# Patient Record
Sex: FEMALE | Race: WHITE | NOT HISPANIC OR LATINO | Employment: OTHER | URBAN - METROPOLITAN AREA
[De-identification: names, ages, dates, MRNs, and addresses within clinical notes are randomized per-mention and may not be internally consistent; named-entity substitution may affect disease eponyms.]

---

## 2017-01-05 ENCOUNTER — RADIANT APPOINTMENT (OUTPATIENT)
Dept: MRI IMAGING | Facility: CLINIC | Age: 72
End: 2017-01-05
Attending: FAMILY MEDICINE
Payer: COMMERCIAL

## 2017-01-05 DIAGNOSIS — M47.12 CERVICAL SPONDYLOSIS WITH MYELOPATHY: ICD-10-CM

## 2017-01-05 PROCEDURE — 72141 MRI NECK SPINE W/O DYE: CPT | Performed by: RADIOLOGY

## 2017-01-05 NOTE — Clinical Note
January 9, 2017      Whitley Wesley  [address]   [place] [address]          Dear Whitley,    Your test results are attached.     The MRI shows narrowing in the neck part of the spine and this may be pinching on the nerves. Please follow up with the specialist as scheduled for further evaluation and treatment.     Please call me if you have any questions about these test results or about your care.    Sincerely,      Mary Rolle MD/deny    Results for orders placed or performed in visit on 01/05/17   MR Cervical Spine w/o Contrast    Narrative    MRI of the Cervical Spine without contrast    History: evaluate for spinal stenosis and spondylolithesis, Other  spondylosis with myelopathy, cervical region.  Comparison: Plain films 12/27/2016     Technique: Sagittal T1-weighted, T2-weighted, sagittal STIR, axial  T2-weighted spin echo and gradient echo images, and sagittal  diffusion-weighted images were obtained of the cervical spine without  intravenous contrast.    Findings:  The cervical vertebrae appear overall well aligned with the exception  of slight retrolisthesis of about 2 mm of C4 on C5 and C3 minimally  anterior to C4.  There is moderate to severe disc space narrowing at  C4-5, and a lesser degree at C5-6, with disc desiccation at each level  in the cervical region.  There is no definite abnormal signal within  the cervical spinal cord at any level.  Regarding bone marrow signal  intensity, no abnormality is visualized on STIR images.    The findings on a level by level basis are as follows:    C2-3:  There is no focal abnormality.    C3-4:  Small central herniation without spinal canal or foraminal  stenosis.    C4-5:  There is moderate spinal canal stenosis with cord compression  due to broad posterior disc bulge superimposed on ligamentum flavum  thickening as well posteriorly. Bilateral moderate to severe neural  foraminal stenoses due to uncinate and facet hypertrophy,  best  demonstrated on oblique sagittal images.    C5-6:  There is no focal abnormality.    C6-7:  Moderate left foraminal stenosis due to uncinate hypertrophy  predominantly with superimposed broad bulge extending into the neural  foramina.    C7-T1: Bilateral uncinate and facet hypertrophy without foraminal or  spinal canal stenosis.    No definite abnormality is noted of the visualized paraspinous  tissues.      Impression    Impression:    1. Multilevel moderate degree of cervical vertebral degenerative  disease, most prominent being the C4-5 spinal canal stenosis. There is  also bilateral neural foraminal stenoses at that level.  2. No abnormal signal within the cervical spinal cord.    LONNIE GILLIS MD

## 2017-01-08 NOTE — PROGRESS NOTES
Quick Note:    Dear Whitley Wesley,    Your test results are attached.     The MRI shows narrowing in the neck part of the spine and this may be pinching on the nerves. Please follow up with the specialist as scheduled for further evaluation and treatment.     Please call me if you have any questions about these test results or about your care.    Sincerely,    Mary Rolle MD  ______

## 2017-01-11 ENCOUNTER — OFFICE VISIT (OUTPATIENT)
Dept: ORTHOPEDICS | Facility: CLINIC | Age: 72
End: 2017-01-11
Payer: COMMERCIAL

## 2017-01-11 VITALS
BODY MASS INDEX: 24.92 KG/M2 | HEIGHT: 61 IN | SYSTOLIC BLOOD PRESSURE: 110 MMHG | HEART RATE: 75 BPM | OXYGEN SATURATION: 98 % | WEIGHT: 132 LBS | DIASTOLIC BLOOD PRESSURE: 62 MMHG

## 2017-01-11 DIAGNOSIS — M54.12 CERVICAL RADICULOPATHY: Primary | ICD-10-CM

## 2017-01-11 PROCEDURE — 99213 OFFICE O/P EST LOW 20 MIN: CPT | Performed by: FAMILY MEDICINE

## 2017-01-11 RX ORDER — PREDNISONE 20 MG/1
40 TABLET ORAL DAILY
Qty: 10 TABLET | Refills: 0 | Status: SHIPPED | OUTPATIENT
Start: 2017-01-11 | End: 2017-01-16

## 2017-01-11 ASSESSMENT — PAIN SCALES - GENERAL: PAINLEVEL: WORST PAIN (10)

## 2017-01-11 NOTE — NURSING NOTE
"Whitley Wesley's goals for this visit include: Find a solution for neck pain.   She requests these members of her care team be copied on today's visit information: yes    PCP: Mary Rolle    Referring Provider:  No referring provider defined for this encounter.    Chief Complaint   Patient presents with     Consult     Neck Pain     Left side of the neck. Pain for 4 months. No known injury.        Initial /62 mmHg  Pulse 75  Ht 1.544 m (5' 0.8\")  Wt 59.875 kg (132 lb)  BMI 25.12 kg/m2  SpO2 98% Estimated body mass index is 25.12 kg/(m^2) as calculated from the following:    Height as of this encounter: 1.544 m (5' 0.8\").    Weight as of this encounter: 59.875 kg (132 lb).  BP completed using cuff size: regular    "

## 2017-01-11 NOTE — MR AVS SNAPSHOT
After Visit Summary   2017    Whitley Wesley    MRN: [id number]           Patient Information     Date Of Birth          1945        Visit Information        Provider Department      2017 9:00 AM Deep Bower,  [facility]        Today's Diagnoses     Cervical radiculopathy    -  1       Care Instructions    Thanks for coming today.  [facility]  [address] [place] [address]    To schedule future appointments in Ortho Clinic, you may call [phone].    To schedule ordered imaging by your provider:   Call Central Imaging [phone]    To schedule an injection ordered by your provider:  Call Central Imaging Injection scheduling line: [phone]  [employer] available online at:  [email or web address]    Please call if any further questions or concerns ([phone]).  Clinic hours 8 am to 5 pm.    Return to clinic (call) if symptoms worsen or fail to improve.          Follow-ups after your visit        Additional Services     PHYSICAL THERAPY REFERRAL       Please eval and treat for cervical radiculopathy                  Who to contact     If you have questions or need follow up information about today's clinic visit or your schedule please contact [facility] directly at [phone].  Normal or non-critical lab and imaging results will be communicated to you by MyChart, letter or phone within 4 business days after the clinic has received the results. If you do not hear from us within 7 days, please contact the clinic through [employer]Class[employer] or phone. If you have a critical or abnormal lab result, we will notify you by phone as soon as possible.  Submit refill requests through [employer] or call your pharmacy and they will forward the refill request to us. Please allow 3 business days for your refill to be completed.          Additional Information About Your Visit        MyChart Information     [employer]Class[employer]alt is an  "electronic gateway that provides easy, online access to your medical records. With "[employer] you can request a clinic appointment, read your test results, renew a prescription or communicate with your care team.     To sign up for "[employer] visit the website at [email or web address]   You will be asked to enter the access code listed below, as well as some personal information. Please follow the directions to create your username and password.     Your access code is: [email or web address]  Expires: 2017 12:59 PM     Your access code will  in 90 days. If you need help or a new code, please contact your [facility] or call [phone] for assistance.        Care EveryWhere ID     This is your Care EveryWhere ID. This could be used by other organizations to access your [facility] medical records  [id number]        Your Vitals Were     Pulse Height BMI (Body Mass Index) Pulse Oximetry          75 1.544 m (5' 0.8\") 25.12 kg/m2 98%         Blood Pressure from Last 3 Encounters:   17 110/62   16 132/80   16 138/81    Weight from Last 3 Encounters:   17 59.875 kg (132 lb)   16 60.238 kg (132 lb 12.8 oz)   16 57.607 kg (127 lb)              We Performed the Following     PHYSICAL THERAPY REFERRAL          Today's Medication Changes          These changes are accurate as of: 17  9:40 AM.  If you have any questions, ask your nurse or doctor.               Start taking these medicines.        Dose/Directions    predniSONE 20 MG tablet   Commonly known as:  DELTASONE   Used for:  Cervical radiculopathy        Dose:  40 mg   Take 2 tablets (40 mg) by mouth daily for 5 days   Quantity:  10 tablet   Refills:  0            Where to get your medicines      These medications were sent to [facility]/pharmacy #[id number] - [place], [place] - [address]  [address] [facility] [address]     Phone:  [phone]    - predniSONE 20 MG tablet             " Primary Care Provider Office Phone # Fax #    Mary Rolle [phone]       [facility] [address] [place]  [place] [address]        Thank you!     Thank you for choosing [facility]  for your care. Our goal is always to provide you with excellent care. Hearing back from our patients is one way we can continue to improve our services. Please take a few minutes to complete the written survey that you may receive in the mail after your visit with us. Thank you!             Your Updated Medication List - Protect others around you: Learn how to safely use, store and throw away your medicines at www.disposemymeds.org.          This list is accurate as of: 1/11/17  9:40 AM.  Always use your most recent med list.                   Brand Name Dispense Instructions for use    acetaminophen 500 MG tablet    TYLENOL     Take 500-1,000 mg by mouth as needed for mild pain       ALPRAZolam 0.25 MG tablet    XANAX    90 tablet    Take 1 tablet (0.25 mg) by mouth 3 times daily AS NEEDED FOR ANXIETY       aspirin 81 MG tablet      Take  by mouth daily. *       CALCIUM 1200 PO      Take  by mouth.       cyanocobalamin 1000 MCG tablet    vitamin  B-12     Take 1,000 mcg by mouth daily       Magnesium Oxide -Mg Supplement 250 MG Tabs      Take by mouth daily.       MULTI-VITAMIN DAILY PO      Take 1 tablet by mouth.       OMEGA-3 FISH OIL PO      Take 1,200 mg by mouth daily       predniSONE 20 MG tablet    DELTASONE    10 tablet    Take 2 tablets (40 mg) by mouth daily for 5 days       Vitamin D3 3000 UNITS Tabs      Take 5,000 Units by mouth

## 2017-01-11 NOTE — PATIENT INSTRUCTIONS
Thanks for coming today.  [facility]  [address] [place] [address]    To schedule future appointments in Ortho Clinic, you may call [phone].    To schedule ordered imaging by your provider:   Call Central Imaging [phone]    To schedule an injection ordered by your provider:  Call Central Imaging Injection scheduling line: [phone]  [employer] available online at:  [email or web address]    Please call if any further questions or concerns ([phone]).  Clinic hours 8 am to 5 pm.    Return to clinic (call) if symptoms worsen or fail to improve.

## 2017-01-11 NOTE — PROGRESS NOTES
HISTORY OF PRESENT ILLNESS  Ms. Wesley is a pleasant 71 year old year old female who presents to clinic today with cervical radiculpathy.  Whitley explains that her neck started to bother her about 4 months ago insidiously.  She was seen by Dr. Rolle and had an MR done a few weeks ago after an episode of intense headache. Really bothers her with movement, can't sleep on her left side.  Has tried hot packs, analgesics, some relief.  Quality:  achy pain, throbs    Severity: severe    Timing: occurs intermittently  Context: occurs while moving and rotating the neck  Modifying factors: resting and non-use makes it better, movement and use makes it worse  Associated signs & symptoms: none  Previous similar pain: yes  Additional history: as documented    MEDICAL HISTORY  Patient Active Problem List   Diagnosis     CARDIOVASCULAR SCREENING; LDL GOAL LESS THAN 130     Mild major depression (H)     Advanced directives, counseling/discussion     Colon polyps     Cataracts, both eyes     Generalized anxiety disorder     Caregiver stress     Cervicalgia     Cervical spondylosis with myelopathy     DDD (degenerative disc disease), cervical       Current Outpatient Prescriptions   Medication Sig Dispense Refill     cyanocobalamin (VITAMIN  B-12) 1000 MCG tablet Take 1,000 mcg by mouth daily       acetaminophen (TYLENOL) 500 MG tablet Take 500-1,000 mg by mouth as needed for mild pain       ALPRAZolam (XANAX) 0.25 MG tablet Take 1 tablet (0.25 mg) by mouth 3 times daily AS NEEDED FOR ANXIETY 90 tablet 0     Omega-3 Fatty Acids (OMEGA-3 FISH OIL PO) Take 1,200 mg by mouth daily       Cholecalciferol (VITAMIN D3) 3000 UNITS TABS Take 5,000 Units by mouth       aspirin 81 MG tablet Take  by mouth daily. *       Multiple Vitamin (MULTI-VITAMIN DAILY PO) Take 1 tablet by mouth.       Calcium Carbonate-Vit D-Min (CALCIUM 1200 PO) Take  by mouth.       Magnesium Oxide -Mg Supplement 250 MG TABS Take by mouth daily.          Allergies  "  Allergen Reactions     No Known Drug Allergies        Family History   Problem Relation Age of Onset     CANCER Mother      \"urinary\"     CANCER Brother      lung     DIABETES No family hx of      Hypertension No family hx of      CEREBROVASCULAR DISEASE No family hx of      Thyroid Disease No family hx of      Glaucoma No family hx of      Macular Degeneration No family hx of        Additional medical/Social/Surgical histories reviewed in [facility] and updated as appropriate.     REVIEW OF SYSTEMS (1/11/2017)  10 point ROS of systems including Constitutional, Eyes, Respiratory, Cardiovascular, Gastroenterology, Genitourinary, Integumentary, Musculoskeletal, Psychiatric were all negative except for pertinent positives noted in my HPI.     PHYSICAL EXAM  Filed Vitals:    01/11/17 0922   BP: 110/62   Pulse: 75   Height: 1.544 m (5' 0.8\")   Weight: 59.875 kg (132 lb)   SpO2: 98%     General  - normal appearance, in no obvious distress  CV  - normal peripheral perfusion  Pulm  - normal respiratory pattern, non-labored  Musculoskeletal - cervical spine  - inspection: normal bone and joint alignment, no obvious kyphosis  - palpation: no paravertebral or bony tenderness  - ROM: pain with rotation and sidebending  - strength: upper extremities 5/5 in all planes  - special tests:  (-) Spurling bilaterally  (-) Dominique's reflex  Neuro  - C5-7 DTRs 2+ bilaterally, no sensory or motor deficit, grossly normal coordination, normal muscle tone  Skin  - no ecchymosis, erythema, warmth, or induration, no obvious rash  Psych  - interactive, appropriate, normal mood and affect          ASSESSMENT & PLAN  Ms. Wesley is a 71 year old year old female who is in the office today with neck pain.    I reviewed her MR in the room with her which reads:    Impression:     1. Multilevel moderate degree of cervical vertebral degenerative  disease, most prominent being the C4-5 spinal canal stenosis. There is  also bilateral neural foraminal stenoses " at that level.  2. No abnormal signal within the cervical spinal cord.    Whitley and I had a good discussion regarding non-operative treatment for degenerative disc disease.  This included strengthening of the paraspinal and core muscles, weight control, and oral analgesics when needed.  We also discussed the role of epidural corticosteroid injections.    I prescribed her a burst of prednisone and referred her to physical therapy.    Whitley and I will follow up in 4-6 weeks.    It was a pleasure taking care of Whitley.        Deep Bower DO, CAM

## 2017-01-12 ENCOUNTER — THERAPY VISIT (OUTPATIENT)
Dept: PHYSICAL THERAPY | Facility: CLINIC | Age: 72
End: 2017-01-12
Payer: COMMERCIAL

## 2017-01-12 DIAGNOSIS — M54.12 CERVICAL RADICULOPATHY: Primary | ICD-10-CM

## 2017-01-12 PROCEDURE — 97161 PT EVAL LOW COMPLEX 20 MIN: CPT | Mod: GP | Performed by: PHYSICAL THERAPIST

## 2017-01-12 PROCEDURE — 97112 NEUROMUSCULAR REEDUCATION: CPT | Mod: GP | Performed by: PHYSICAL THERAPIST

## 2017-01-12 PROCEDURE — G8982 BODY POS GOAL STATUS: HCPCS | Mod: GP | Performed by: PHYSICAL THERAPIST

## 2017-01-12 PROCEDURE — G8981 BODY POS CURRENT STATUS: HCPCS | Mod: GP | Performed by: PHYSICAL THERAPIST

## 2017-01-12 PROCEDURE — 97530 THERAPEUTIC ACTIVITIES: CPT | Mod: GP | Performed by: PHYSICAL THERAPIST

## 2017-01-12 NOTE — PROGRESS NOTES
[facility] Initial Evaluation      Subjective:    Whitley Wesley is a 71 year old female with a cervical spine condition.    Condition occurred: for unknown reasons.  This is a chronic condition  4-5 months.   Md: 1/11/2017. .    Patient reports pain:  Cervical left side.  Radiates to:  Head.  Pain is described as sharp and aching and is constant and reported as 8/10.  Associated symptoms:  Headache, loss of motion/stiffness and tingling. Pain is the same all the time.  Symptoms are exacerbated by rotating head, looking up or down and sitting and relieved by heat.  Since onset symptoms are unchanged.  Special tests:  MRI.  Previous treatment: none.    General health as reported by patient is excellent.  Pertinent medical history includes:  None.  Medical allergies: no.  Other surgeries include:  No.  Current medications:  Pain medication (prednisone. ).  Current occupation is retired.        Barriers include:  None as reported by the patient.    Red flags:  Chest pain.                      Objective:    System    Physical Exam    [place] Cervical Evaluation    Posture:  Sitting: poor        Correction of Posture: no effect    Movement Loss:  Protrusion (PRO): nil  Flexion (Flex): nil  Retraction (RET): pain  Extension (EXT): pain  Lateral Flexion Right (LF R): pain      Rotation Left (ROT L): pain  Test Movements:      RET: During: no effect    Repeat RET: During: decreases    RET EXT: During: centralizing    Repeat RET EXT: During: centralizing                          Conclusion: derangement  Principle of Treatment:  Posture Correction: IN SITTING WITH TOWEL ROLL.     Extension: RETRACTION , RETRACTION EXTENSION    Other: NEUTRAL SPINE, THER EX, THER ACT, NMR, MANUAL THERAPY.                                          ROS    Assessment/Plan:      Patient is a 71 year old female with cervical complaints.    Patient has the following significant findings with corresponding treatment plan.                 Diagnosis 1:  CERVICAL RADICULOPATHY  Pain -  hot/cold therapy, US, electric stimulation, mechanical traction, manual therapy, splint/taping/bracing/orthotics, self management, education, directional preference exercise and home program    Therapy Evaluation Codes:   1) History comprised of:   Personal factors that impact the plan of care:      None.    Comorbidity factors that impact the plan of care are:      None.     Medications impacting care: Steroids.  2) Examination of Body Systems comprised of:   Body structures and functions that impact the plan of care:      Cervical spine.   Activity limitations that impact the plan of care are:      Sitting.  3) Clinical presentation characteristics are:   Stable/Uncomplicated.  4) Decision-Making    Low complexity using standardized patient assessment instrument and/or measureable assessment of functional outcome.  Cumulative Therapy Evaluation is: Low complexity.    Previous and current functional limitations:  (See Goal Flow Sheet for this information)    Short term and Long term goals: (See Goal Flow Sheet for this information)     Communication ability:  Patient appears to be able to clearly communicate and understand verbal and written communication and follow directions correctly.  Treatment Explanation - The following has been discussed with the patient:   RX ordered/plan of care  Anticipated outcomes  Possible risks and side effects  This patient would benefit from PT intervention to resume normal activities.   Rehab potential is fair.    Frequency:  1 X week, once daily  Duration:  for 6 weeks  Discharge Plan:  Achieve all LTG.  Independent in home treatment program.  Reach maximal therapeutic benefit.      Please refer to the daily flowsheet for treatment today, total treatment time and time spent performing 1:1 timed codes.

## 2017-01-19 ENCOUNTER — THERAPY VISIT (OUTPATIENT)
Dept: PHYSICAL THERAPY | Facility: CLINIC | Age: 72
End: 2017-01-19
Payer: COMMERCIAL

## 2017-01-19 DIAGNOSIS — M54.12 CERVICAL RADICULOPATHY: Primary | ICD-10-CM

## 2017-01-19 PROCEDURE — 97140 MANUAL THERAPY 1/> REGIONS: CPT | Mod: GP | Performed by: PHYSICAL THERAPIST

## 2017-01-19 PROCEDURE — 97035 APP MDLTY 1+ULTRASOUND EA 15: CPT | Mod: GP | Performed by: PHYSICAL THERAPIST

## 2017-01-19 PROCEDURE — 97110 THERAPEUTIC EXERCISES: CPT | Mod: GP | Performed by: PHYSICAL THERAPIST

## 2017-04-07 DIAGNOSIS — F41.1 GENERALIZED ANXIETY DISORDER: ICD-10-CM

## 2017-04-07 RX ORDER — ALPRAZOLAM 0.25 MG
TABLET ORAL
Qty: 90 TABLET | Refills: 0 | Status: SHIPPED | OUTPATIENT
Start: 2017-04-07 | End: 2017-07-07

## 2017-06-19 ENCOUNTER — OFFICE VISIT (OUTPATIENT)
Dept: FAMILY MEDICINE | Facility: CLINIC | Age: 72
End: 2017-06-19
Payer: COMMERCIAL

## 2017-06-19 VITALS
HEART RATE: 80 BPM | OXYGEN SATURATION: 97 % | SYSTOLIC BLOOD PRESSURE: 117 MMHG | BODY MASS INDEX: 25.3 KG/M2 | WEIGHT: 134 LBS | DIASTOLIC BLOOD PRESSURE: 77 MMHG | TEMPERATURE: 98.6 F | HEIGHT: 61 IN

## 2017-06-19 DIAGNOSIS — R53.82 CHRONIC FATIGUE: ICD-10-CM

## 2017-06-19 DIAGNOSIS — Z00.00 NORMAL PHYSICAL EXAM: Primary | ICD-10-CM

## 2017-06-19 DIAGNOSIS — E78.5 HYPERLIPIDEMIA LDL GOAL <160: ICD-10-CM

## 2017-06-19 DIAGNOSIS — F32.0 MAJOR DEPRESSIVE DISORDER, SINGLE EPISODE, MILD (H): ICD-10-CM

## 2017-06-19 DIAGNOSIS — Z63.6 CAREGIVER STRESS: ICD-10-CM

## 2017-06-19 DIAGNOSIS — F41.1 GENERALIZED ANXIETY DISORDER: ICD-10-CM

## 2017-06-19 DIAGNOSIS — E55.9 VITAMIN D DEFICIENCY: ICD-10-CM

## 2017-06-19 LAB
ALBUMIN SERPL-MCNC: 3.6 G/DL (ref 3.4–5)
ALBUMIN UR-MCNC: NEGATIVE MG/DL
ALP SERPL-CCNC: 70 U/L (ref 40–150)
ALT SERPL W P-5'-P-CCNC: 31 U/L (ref 0–50)
ANION GAP SERPL CALCULATED.3IONS-SCNC: 7 MMOL/L (ref 3–14)
APPEARANCE UR: CLEAR
AST SERPL W P-5'-P-CCNC: 19 U/L (ref 0–45)
BACTERIA #/AREA URNS HPF: ABNORMAL /HPF
BILIRUB SERPL-MCNC: 0.9 MG/DL (ref 0.2–1.3)
BILIRUB UR QL STRIP: NEGATIVE
BUN SERPL-MCNC: 16 MG/DL (ref 7–30)
CALCIUM SERPL-MCNC: 9.2 MG/DL (ref 8.5–10.1)
CHLORIDE SERPL-SCNC: 110 MMOL/L (ref 94–109)
CHOLEST SERPL-MCNC: 220 MG/DL
CO2 SERPL-SCNC: 27 MMOL/L (ref 20–32)
COLOR UR AUTO: YELLOW
CREAT SERPL-MCNC: 0.88 MG/DL (ref 0.52–1.04)
DEPRECATED CALCIDIOL+CALCIFEROL SERPL-MC: 80 UG/L (ref 20–75)
ERYTHROCYTE [DISTWIDTH] IN BLOOD BY AUTOMATED COUNT: 13.1 % (ref 10–15)
GFR SERPL CREATININE-BSD FRML MDRD: 63 ML/MIN/1.7M2
GLUCOSE SERPL-MCNC: 86 MG/DL (ref 70–99)
GLUCOSE UR STRIP-MCNC: NEGATIVE MG/DL
HCT VFR BLD AUTO: 43.6 % (ref 35–47)
HDLC SERPL-MCNC: 92 MG/DL
HGB BLD-MCNC: 14.7 G/DL (ref 11.7–15.7)
HGB UR QL STRIP: NEGATIVE
KETONES UR STRIP-MCNC: NEGATIVE MG/DL
LDLC SERPL CALC-MCNC: 112 MG/DL
LEUKOCYTE ESTERASE UR QL STRIP: ABNORMAL
MCH RBC QN AUTO: 31.5 PG (ref 26.5–33)
MCHC RBC AUTO-ENTMCNC: 33.7 G/DL (ref 31.5–36.5)
MCV RBC AUTO: 94 FL (ref 78–100)
MUCOUS THREADS #/AREA URNS LPF: PRESENT /LPF
NITRATE UR QL: NEGATIVE
NON-SQ EPI CELLS #/AREA URNS LPF: ABNORMAL /LPF
NONHDLC SERPL-MCNC: 128 MG/DL
PH UR STRIP: 5.5 PH (ref 5–7)
PLATELET # BLD AUTO: 239 10E9/L (ref 150–450)
POTASSIUM SERPL-SCNC: 4.3 MMOL/L (ref 3.4–5.3)
PROT SERPL-MCNC: 6.9 G/DL (ref 6.8–8.8)
RBC # BLD AUTO: 4.66 10E12/L (ref 3.8–5.2)
RBC #/AREA URNS AUTO: ABNORMAL /HPF (ref 0–2)
SODIUM SERPL-SCNC: 144 MMOL/L (ref 133–144)
SP GR UR STRIP: >1.03 (ref 1–1.03)
T4 FREE SERPL-MCNC: 1.03 NG/DL (ref 0.76–1.46)
TRIGL SERPL-MCNC: 78 MG/DL
TSH SERPL DL<=0.005 MIU/L-ACNC: 4.11 MU/L (ref 0.4–4)
URN SPEC COLLECT METH UR: ABNORMAL
UROBILINOGEN UR STRIP-ACNC: 0.2 EU/DL (ref 0.2–1)
WBC # BLD AUTO: 7 10E9/L (ref 4–11)
WBC #/AREA URNS AUTO: ABNORMAL /HPF (ref 0–2)

## 2017-06-19 PROCEDURE — 81001 URINALYSIS AUTO W/SCOPE: CPT | Performed by: FAMILY MEDICINE

## 2017-06-19 PROCEDURE — 36415 COLL VENOUS BLD VENIPUNCTURE: CPT | Performed by: FAMILY MEDICINE

## 2017-06-19 PROCEDURE — 85027 COMPLETE CBC AUTOMATED: CPT | Performed by: FAMILY MEDICINE

## 2017-06-19 PROCEDURE — 84439 ASSAY OF FREE THYROXINE: CPT | Performed by: FAMILY MEDICINE

## 2017-06-19 PROCEDURE — 80053 COMPREHEN METABOLIC PANEL: CPT | Performed by: FAMILY MEDICINE

## 2017-06-19 PROCEDURE — 84443 ASSAY THYROID STIM HORMONE: CPT | Performed by: FAMILY MEDICINE

## 2017-06-19 PROCEDURE — 99397 PER PM REEVAL EST PAT 65+ YR: CPT | Performed by: FAMILY MEDICINE

## 2017-06-19 PROCEDURE — 82306 VITAMIN D 25 HYDROXY: CPT | Performed by: FAMILY MEDICINE

## 2017-06-19 PROCEDURE — 80061 LIPID PANEL: CPT | Performed by: FAMILY MEDICINE

## 2017-06-19 SDOH — SOCIAL STABILITY - SOCIAL INSECURITY: DEPENDENT RELATIVE NEEDING CARE AT HOME: Z63.6

## 2017-06-19 ASSESSMENT — ANXIETY QUESTIONNAIRES
3. WORRYING TOO MUCH ABOUT DIFFERENT THINGS: NOT AT ALL
7. FEELING AFRAID AS IF SOMETHING AWFUL MIGHT HAPPEN: NOT AT ALL
GAD7 TOTAL SCORE: 0
1. FEELING NERVOUS, ANXIOUS, OR ON EDGE: NOT AT ALL
2. NOT BEING ABLE TO STOP OR CONTROL WORRYING: NOT AT ALL
6. BECOMING EASILY ANNOYED OR IRRITABLE: NOT AT ALL
5. BEING SO RESTLESS THAT IT IS HARD TO SIT STILL: NOT AT ALL
IF YOU CHECKED OFF ANY PROBLEMS ON THIS QUESTIONNAIRE, HOW DIFFICULT HAVE THESE PROBLEMS MADE IT FOR YOU TO DO YOUR WORK, TAKE CARE OF THINGS AT HOME, OR GET ALONG WITH OTHER PEOPLE: NOT DIFFICULT AT ALL

## 2017-06-19 ASSESSMENT — PATIENT HEALTH QUESTIONNAIRE - PHQ9: 5. POOR APPETITE OR OVEREATING: NOT AT ALL

## 2017-06-19 NOTE — PATIENT INSTRUCTIONS
Preventive Health Recommendations  Female Ages 65 +    Yearly exam:     See your health care provider every year in order to  o Review health changes.   o Discuss preventive care.    o Review your medicines if your doctor has prescribed any.      You no longer need a yearly Pap test unless you've had an abnormal Pap test in the past 10 years. If you have vaginal symptoms, such as bleeding or discharge, be sure to talk with your provider about a Pap test.      Every 1 to 2 years, have a mammogram.  If you are over 69, talk with your health care provider about whether or not you want to continue having screening mammograms.      Every 10 years, have a colonoscopy. Or, have a yearly FIT test (stool test). These exams will check for colon cancer.       Have a cholesterol test every 5 years, or more often if your doctor advises it.       Have a diabetes test (fasting glucose) every three years. If you are at risk for diabetes, you should have this test more often.       At age 65, have a bone density scan (DEXA) to check for osteoporosis (brittle bone disease).    Shots:    Get a flu shot each year.    Get a tetanus shot every 10 years.    Talk to your doctor about your pneumonia vaccines. There are now two you should receive - Pneumovax (PPSV 23) and Prevnar (PCV 13).    Talk to your doctor about the shingles vaccine.    Talk to your doctor about the hepatitis B vaccine.    Nutrition:     Eat at least 5 servings of fruits and vegetables each day.      Eat whole-grain bread, whole-wheat pasta and brown rice instead of white grains and rice.      Talk to your provider about Calcium and Vitamin D.     Lifestyle    Exercise at least 150 minutes a week (30 minutes a day, 5 days a week). This will help you control your weight and prevent disease.      Limit alcohol to one drink per day.      No smoking.       Wear sunscreen to prevent skin cancer.       See your dentist twice a year for an exam and cleaning.      See your  eye doctor every 1 to 2 years to screen for conditions such as glaucoma, macular degeneration, cataracts, etc     This summary includes the important diagnoses, test, medications and other important parts of your medical history.  Below are a few good we sites you can use to learn more about these.     Www.[employer].org : Up to date and easily searchable information on multiple topics.  Www.[employer].org/Pharmacy/c_539084.asp : [facility] $4.99 medications  Www.medlineplus.gov : medication info, interactive tutorials, watch real surgeries online  Www.familydoctor.org : good info from the Academy of Family Physicians  Www.Appticles.BuildingIQ : good info from the [facility]  Www.cdc.gov : public health info, travel advisories, epidemics (H1N1)  Www.aap.org : children's health info, normal development, vaccinations  Www.health.state.[place].us : [place] dept of heat, public health issues in [place], N1N1    Based on your medical history and these are the current health maintenance or preventive care services that you are due for (some may have been done at this visit:)  Health Maintenance Due   Topic Date Due     EYE EXAM Q1 YEAR  01/26/2017     FALL RISK ASSESSMENT  03/03/2017     DEPRESSION ACTION PLAN Q1 YR  03/03/2017     PHQ-9 Q6 MONTHS  06/27/2017     =================================================================================  Normal Values   Blood pressure  <140/90 for most adults    <130/80 for some chronic diseases (ask your care team about yours)    BMI (body mass index)  18.5-25 kg/m2 (based on height and weight)     Thank you for visiting [facility]    Normal or non-critical lab and imaging results will be communicated to you by MyChart, letter or phone within 7 days.  If you do not hear from us within 10 days, please call the clinic. If you have a critical or abnormal lab result, we will notify you by phone as soon as possible.     If you have any questions regarding your visit please  contact:     Team Comfort:   Clinic Hours Telephone Number   Dr. Omar Joseph   7am-5pm  Monday - Friday [phone]  Marcus RN   Pharmacy 8am-8pm Monday-Thursday      8am-6pm Friday  9am-5pm Saturday-Sunday [phone]   Urgent Care 11am-8pm Monday-Friday        9am-5pm Saturday-Sunday [phone]     After hours, weekend or if you need to make an appointment with your primary provider please call [phone].   After Hours nurse advise: call [facility]: [phone]    Medication Refills:  Call your pharmacy and they will forward the refill to us. Please allow 3 business days for your refills to be completed.    Use [email or web address] (secure email communication and access to your chart) to send your primary care provider a message or make an appointment. Ask someone on your Team how to sign up for [email or web address]. To log on to [employer] Alert[employer] or for more information in [place] please visit the website at [email or web address].  As of October 8, 2013, all password changes, disabled accounts, or ID changes in [email or web address] will be done by our Access Services Department.   If you need help with your account or password, call: [phone]. Clinic staff no longer has the ability to change passwords.     Managing Fatigue     Family members can help with meals and chores around the house.   Fatigue is common. It can be caused by worry, lack of sleep, or poor appetite. Fatigue can also be a sign of anemia, a shortage of red blood cells. You might need medical treatment for anemia. The tips below can help you feel better.  Conserving energy    Keep track of the times of day when you are most tired and plan around them. For instance, if you are more tired in the afternoon, try to get tasks done in the morning.    Decide which tasks are most important. Do those first.    Pass tasks along to others when you need to. Ask for help.    Accept help  when it s offered. Tell people what they can do to help. For instance, you may need someone to fix a meal, fold clothes, or put gas in your car.    Plan rest times. You may want to take a nap each day. Just sitting quietly for a few minutes can make you feel more rested.  What you can do to feel better    Relax before you try to sleep. Take a bath or read for a while.    Form a sleep pattern. Go to bed at the same time each night and get up at the same time each morning.    Eat well. Choose foods from all of the food groups each day.    Exercise. Take a brisk walk to help increase your energy.    Avoid caffeine and alcohol. Drink plenty of water or fruit juices instead.  Treating anemia  If you begin to feel more tired than normal, tell your doctor. Fatigue could be a sign of anemia. This problem is fairly common in cancer patients, especially during chemotherapy and radiation treatments. If your red blood cell count is too low, you may get a blood transfusion. In some cases, you may need medicine to increase the number of red blood cells your body makes.  When to call your healthcare provider  Call your healthcare provider if you have:    Shortness of breath or chest pain    A dizzy feeling when you get up from lying or sitting down    Paler skin than normal    Extreme tiredness that is not helped by sleep   Date Last Reviewed: 1/3/2016    1445-4631 [employer]. [address] [place] [address] 13395. All rights reserved. This information is not intended as a substitute for professional medical care. Always follow your healthcare professional's instructions.        Fibromyalgia  Fibromyalgia is a chronic condition. It causes pain and tenderness in connective tissues. This causes muscle pain. Often, there are also many tender areas throughout the body. Symptoms may also include stiffness and feelings of numbness and tingling. Symptoms may be worse upon waking up. They may increase with poor sleep,  heavy activity, cold or damp weather, anxiety, or stress.  People with fibromyalgia often feel tired. They may have trouble sleeping. Other symptoms include morning stiffness, headaches, and painful menstrual periods. Some people have problems with thinking clearly and changes in memory.  The cause of fibromyalgia is not known. Symptoms are similar to that of other diseases. These include rheumatoid arthritis, low thyroid, chronic fatigue syndrome, and Lyme disease. In some cases, these diseases may occur together.  Fibromyalgia is often treated with medicines. You and your healthcare provider can discuss the medication plan that may work best for you. You may have to try more than one medicine or combination of medicines before you find what works for you.  Home care    If your healthcare provider has prescribed or recommended medicines, take them as directed.    Rest as needed. Try to get enough sleep. If you have trouble sleeping, discuss this with your healthcare provider.     Be active. Regular exercise can help manage symptoms. Some options include walking, swimming, and biking. Strengthening exercises may also be helpful. Start an exercise program gradually. Talk to your healthcare provider about the best ways to be active.    Follow a healthy diet. Limit caffeine and alcohol. If you smoke, ask your healthcare provider for help to stop.    Notice how your body reacts to stress. Learn to listen to your body signals. This will help you take action before the stress becomes severe.    Learn relaxation techniques. Also consider joining a stress reduction program or class.    Talk to your healthcare provider about trying complementary treatments. These include acupuncture, hypnosis, and biofeedback. Yoga and gael chi may be helpful.    Ask your healthcare provider about cognitive behavioral therapy (CBT). This type of counseling can help people with fibromyalgia cope better with their illness.  Follow-up  care  Follow up with your healthcare provider or as advised by our staff. In many cases, fibromyalgia is best treated with a team approach.  This may involve your primary care provider, a rheumatologist, a physical therapist, and a mental health professional.   For more information: National [place] of Arthritis and Musculoskeletal and Skin Diseases (NIAMS)  www.niams.nih.gov 373-745-3517  When to seek medical advice  Contact your healthcare provider if any of the following occurs:    Symptoms getting worse or new symptoms developing    You feel hopeless, helpless, or lose interest in day-to-day life  Date Last Reviewed: 4/26/2015 2000-2017 [employer]. [address] [place] [address], [place] [address]. All rights reserved. This information is not intended as a substitute for professional medical care. Always follow your healthcare professional's instructions.

## 2017-06-19 NOTE — PROGRESS NOTES
SUBJECTIVE:                                                            Whitley Wesley is a 72 year old female who presents for Preventive Visit.    Are you in the first 12 months of your Medicare Part B coverage?  No    Healthy Habits:    Do you get at least three servings of calcium containing foods daily (dairy, green leafy vegetables, etc.)? yes    Amount of exercise or daily activities, outside of work: 0 day(s) per week    Problems taking medications regularly No    Medication side effects: No    Have you had an eye exam in the past two years? no    Do you see a dentist twice per year? no    Do you have sleep apnea, excessive snoring or daytime drowsiness?no    Pimples on head, chest and neck. Itching all over. Pain in arms and legs on skin and muscle. Losing hair more than usual.     Mammogram on Friday at 9:30.     COGNITIVE SCREEN  1) Repeat 3 items (Banana, Sunrise, Chair)    2) Clock draw:NORMAL  3) 3 item recall: Recalls NO objects   Results: 0 items recalled: PROBABLE COGNITIVE IMPAIRMENT, **INFORM PROVIDER**    Mini-CogTM Copyright MICHAEL Houston. Licensed by the author for use in [facility]; reprinted with permission (tl@[facility]). All rights reserved.            -------------------------------------    Reviewed and updated as needed this visit by clinical staff  Tobacco  Allergies  Meds  Med Hx  Surg Hx  Fam Hx  Soc Hx        Reviewed and updated as needed this visit by Provider        Social History   Substance Use Topics     Smoking status: Never Smoker     Smokeless tobacco: Never Used     Alcohol use No       The patient does not drink >3 drinks per day nor >7 drinks per week.    Today's PHQ-2 Score:   PHQ-2 ( 1999 Pfizer) 6/19/2017 6/19/2017   Q1: Little interest or pleasure in doing things 0 1   Q2: Feeling down, depressed or hopeless 0 1   PHQ-2 Score 0 2       Do you feel safe in your environment - Yes    Do you have a Health Care Directive?: No: Advance care planning was  reviewed with patient; patient declined at this time.    Current providers sharing in care for this patient include:   Patient Care Team:  Mary Rolle MD as PCP - General (Family Practice)      Hearing impairment: No    Ability to successfully perform activities of daily living: Yes, no assistance needed     Fall risk:  Fallen 2 or more times in the past year?: No  Any fall with injury in the past year?: No    Home safety:  none identified  click delete button to remove this line now    The following health maintenance items are reviewed in Epic and correct as of today:  Health Maintenance   Topic Date Due     EYE EXAM Q1 YEAR  01/26/2017     FALL RISK ASSESSMENT  03/03/2017     DEPRESSION ACTION PLAN Q1 YR  03/03/2017     PHQ-9 Q6 MONTHS  06/27/2017     INFLUENZA VACCINE (SYSTEM ASSIGNED)  09/01/2017     LIPID SCREEN Q5 YR FEMALE (SYSTEM ASSIGNED)  01/07/2018     COLONOSCOPY Q5 YR  02/15/2018     MAMMO SCREEN Q2 YR (SYSTEM ASSIGNED)  06/14/2018     ADVANCE DIRECTIVE PLANNING Q5 YRS  03/28/2019     TETANUS IMMUNIZATION (SYSTEM ASSIGNED)  02/04/2023     DEXA SCAN SCREENING (SYSTEM ASSIGNED)  Completed     PNEUMOCOCCAL  Completed         Mammogram Screening: Mammo discussed, not appropriate for or declined by this patient.     ROS:  Constitutional, HEENT, cardiovascular, pulmonary, gi and gu systems are negative, except as otherwise noted.    Problem list, Medication list, Allergies, and Medical/Social/Surgical histories reviewed in EPIC and updated as appropriate.  Labs reviewed in EPIC  BP Readings from Last 3 Encounters:   06/19/17 117/77   01/11/17 110/62   12/27/16 132/80    Wt Readings from Last 3 Encounters:   06/19/17 134 lb (60.8 kg)   01/11/17 132 lb (59.9 kg)   12/27/16 132 lb 12.8 oz (60.2 kg)                  Patient Active Problem List   Diagnosis     CARDIOVASCULAR SCREENING; LDL GOAL LESS THAN 130     Mild major depression (H)     Advanced directives, counseling/discussion     Colon polyps      "Cataracts, both eyes     Generalized anxiety disorder     Caregiver stress     Cervicalgia     Cervical spondylosis with myelopathy     DDD (degenerative disc disease), cervical     Cervical radiculopathy     Hyperlipidemia LDL goal <160     Past Surgical History:   Procedure Laterality Date     ANKLE SURGERY Left 2007    Left ankle       Social History   Substance Use Topics     Smoking status: Never Smoker     Smokeless tobacco: Never Used     Alcohol use No     Family History   Problem Relation Age of Onset     CANCER Mother      \"urinary\"     CANCER Brother      lung     DIABETES No family hx of      Hypertension No family hx of      CEREBROVASCULAR DISEASE No family hx of      Thyroid Disease No family hx of      Glaucoma No family hx of      Macular Degeneration No family hx of          Current Outpatient Prescriptions   Medication Sig Dispense Refill     UNABLE TO FIND MEDICATION NAME: Mohamud Calm as needed       CALCIUM PO        ALPRAZolam (XANAX) 0.25 MG tablet TAKE ONE TABLET BY MOUTH 3 TIMES A DAY AS NEEDED FOR ANXIETY 90 tablet 0     cyanocobalamin (VITAMIN  B-12) 1000 MCG tablet Take 1,000 mcg by mouth daily       acetaminophen (TYLENOL) 500 MG tablet Take 500-1,000 mg by mouth as needed for mild pain       Omega-3 Fatty Acids (OMEGA-3 FISH OIL PO) Take 1,200 mg by mouth daily       Cholecalciferol (VITAMIN D3) 3000 UNITS TABS Take 5,000 Units by mouth       aspirin 81 MG tablet Take  by mouth daily. *       Multiple Vitamin (MULTI-VITAMIN DAILY PO) Take 1 tablet by mouth.       Magnesium Oxide -Mg Supplement 250 MG TABS Take 500 mg by mouth daily        Allergies   Allergen Reactions     No Known Drug Allergies      Recent Labs   Lab Test  06/19/17   0906  12/27/16   1635  01/12/16   1546   03/04/14   1019  01/07/13   0845 08/08/12  03/10/12   LDL  112*   --    --    --    --   111   --    --   101   HDL  92   --    --    --    --   83   --    --   96   TRIG  78   --    --    --    --   73  107   --   " "52   ALT  31   --    --    --   35   --   17   --    --    CR  0.88  0.72  0.86   < >  0.67  0.80  0.79   < >   --    GFRESTIMATED  63  80  65   < >  87  71  77   < >   --    GFRESTBLACK  76  >90   GFR Calc    79   < >  >90  86  89   < >   --    POTASSIUM  4.3  3.9  3.8   < >  3.8  4.0  4   < >   --    TSH  4.11*   --   3.39   --   4.85  4.31  3.69   --   3.80    < > = values in this interval not displayed.      OBJECTIVE:                                                            /77 (BP Location: Left arm, Patient Position: Chair, Cuff Size: Adult Regular)  Pulse 80  Temp 98.6  F (37  C) (Oral)  Ht 5' 0.75\" (1.543 m)  Wt 134 lb (60.8 kg)  SpO2 97%  BMI 25.53 kg/m2 Estimated body mass index is 25.53 kg/(m^2) as calculated from the following:    Height as of this encounter: 5' 0.75\" (1.543 m).    Weight as of this encounter: 134 lb (60.8 kg).  EXAM:   GENERAL APPEARANCE: healthy, alert and no distress  EYES: Eyes grossly normal to inspection, PERRL and conjunctivae and sclerae normal  HENT: ear canals and TM's normal, nose and mouth without ulcers or lesions, oropharynx clear and oral mucous membranes moist  NECK: no adenopathy, no asymmetry, masses, or scars and thyroid normal to palpation  RESP: lungs clear to auscultation - no rales, rhonchi or wheezes  CV: regular rates and rhythm, normal S1 S2, no S3 or S4, no murmur, click or rub, no peripheral edema and peripheral pulses strong  ABDOMEN: soft, nontender, no hepatosplenomegaly, no masses and bowel sounds normal  MS: no musculoskeletal defects are noted and gait is age appropriate without ataxia  SKIN: no suspicious lesions or rashes, age related skin changes with sun damage in upper chest and neck area with seborrheic keratosis   NEURO: Normal strength and tone, sensory exam grossly normal, mentation intact and speech normal  PSYCH: mentation appears normal and affect normal/bright   Foot exam: normal DP and PT pulses, no trophic " "changes or ulcerative lesions, normal calluses, no deformities, normal sensory exam and normal monofilament exam     ASSESSMENT / PLAN:                                                                ICD-10-CM    1. Normal physical exam Z00.00 Stable health problems that have not been changing.    2. Major depressive disorder, single episode, mild (H) F32.0 Seems to be doing better with  not having any recent illnesses.    3. Generalized anxiety disorder F41.1 Takes ativan as needed.    4. Caregiver stress Z63.6 stable   5. Chronic fatigue R53.82 CBC with platelets     Comprehensive metabolic panel     TSH with free T4 reflex     UA reflex to Microscopic and Culture   6. Vitamin D deficiency E55.9 Vitamin D Deficiency   7. Hyperlipidemia LDL goal <160 E78.5 Lipid panel reflex to direct LDL       End of Life Planning:  Patient currently has an advanced directive: Yes.  Practitioner is supportive of decision.    COUNSELING:  Reviewed preventive health counseling, as reflected in patient instructions       Regular exercise       Healthy diet/nutrition       Vision screening       Hearing screening       Dental care        Estimated body mass index is 25.53 kg/(m^2) as calculated from the following:    Height as of this encounter: 5' 0.75\" (1.543 m).    Weight as of this encounter: 134 lb (60.8 kg).     reports that she has never smoked. She has never used smokeless tobacco.      Appropriate preventive services were discussed with this patient, including applicable screening as appropriate for cardiovascular disease, diabetes, osteopenia/osteoporosis, and glaucoma.  As appropriate for age/gender, discussed screening for colorectal cancer, prostate cancer, breast cancer, and cervical cancer. Checklist reviewing preventive services available has been given to the patient.    Reviewed patients plan of care and provided an AVS. The Basic Care Plan (routine screening as documented in Health Maintenance) for Whitley" meets the Care Plan requirement. This Care Plan has been established and reviewed with the Patient.    Counseling Resources:  ATP IV Guidelines  Pooled Cohorts Equation Calculator  Breast Cancer Risk Calculator  FRAX Risk Assessment  ICSI Preventive Guidelines  Dietary Guidelines for Americans, 2010  USDA's MyPlate  ASA Prophylaxis  Lung CA Screening    Mary Rolle MD  [facility]

## 2017-06-19 NOTE — MR AVS SNAPSHOT
After Visit Summary   6/19/2017    Whitley Wesley    MRN: [id number]           Patient Information     Date Of Birth          1945        Visit Information        Provider Department      6/19/2017 8:20 AM Mary Rolle MD [facility]        Today's Diagnoses     Normal physical exam    -  1    Major depressive disorder, single episode, mild (H)        Generalized anxiety disorder        Caregiver stress        Chronic fatigue        Vitamin D deficiency        Hyperlipidemia LDL goal <160          Care Instructions      Preventive Health Recommendations  Female Ages 65 +    Yearly exam:     See your health care provider every year in order to  o Review health changes.   o Discuss preventive care.    o Review your medicines if your doctor has prescribed any.      You no longer need a yearly Pap test unless you've had an abnormal Pap test in the past 10 years. If you have vaginal symptoms, such as bleeding or discharge, be sure to talk with your provider about a Pap test.      Every 1 to 2 years, have a mammogram.  If you are over 69, talk with your health care provider about whether or not you want to continue having screening mammograms.      Every 10 years, have a colonoscopy. Or, have a yearly FIT test (stool test). These exams will check for colon cancer.       Have a cholesterol test every 5 years, or more often if your doctor advises it.       Have a diabetes test (fasting glucose) every three years. If you are at risk for diabetes, you should have this test more often.       At age 65, have a bone density scan (DEXA) to check for osteoporosis (brittle bone disease).    Shots:    Get a flu shot each year.    Get a tetanus shot every 10 years.    Talk to your doctor about your pneumonia vaccines. There are now two you should receive - Pneumovax (PPSV 23) and Prevnar (PCV 13).    Talk to your doctor about the shingles vaccine.    Talk to your doctor about the hepatitis  B vaccine.    Nutrition:     Eat at least 5 servings of fruits and vegetables each day.      Eat whole-grain bread, whole-wheat pasta and brown rice instead of white grains and rice.      Talk to your provider about Calcium and Vitamin D.     Lifestyle    Exercise at least 150 minutes a week (30 minutes a day, 5 days a week). This will help you control your weight and prevent disease.      Limit alcohol to one drink per day.      No smoking.       Wear sunscreen to prevent skin cancer.       See your dentist twice a year for an exam and cleaning.      See your eye doctor every 1 to 2 years to screen for conditions such as glaucoma, macular degeneration, cataracts, etc     This summary includes the important diagnoses, test, medications and other important parts of your medical history.  Below are a few good we sites you can use to learn more about these.     Www.Mindmancer.[employer] : Up to date and easily searchable information on multiple topics.  Www.iChange/Pharmacy/c_539084.asp : [facility] $4.99 medications  Www.docplanner.gov : medication info, interactive tutorials, watch real surgeries online  Www.familydoctor.org : good info from the Academy of Family Physicians  Www.Vant[employer] Diagnosticsinic.com : good info from the [facility]  Www.cdc.gov : public health info, travel advisories, epidemics (H1N1)  Www.aap.org : children's health info, normal development, vaccinations  Www.health.state.[place].us : [place] dept of heatlh, public health issues in [place], N1N1    Based on your medical history and these are the current health maintenance or preventive care services that you are due for (some may have been done at this visit:)  Health Maintenance Due   Topic Date Due     EYE EXAM Q1 YEAR  01/26/2017     FALL RISK ASSESSMENT  03/03/2017     DEPRESSION ACTION PLAN Q1 YR  03/03/2017     PHQ-9 Q6 MONTHS  06/27/2017     =================================================================================  Normal Values   Blood pressure   <140/90 for most adults    <130/80 for some chronic diseases (ask your care team about yours)    BMI (body mass index)  18.5-25 kg/m2 (based on height and weight)     Thank you for visiting [facility]    Normal or non-critical lab and imaging results will be communicated to you by MyChart, letter or phone within 7 days.  If you do not hear from us within 10 days, please call the clinic. If you have a critical or abnormal lab result, we will notify you by phone as soon as possible.     If you have any questions regarding your visit please contact:     Team Comfort:   Clinic Hours Telephone Number   Dr. Omar Joseph   7am-5pm  Monday - Friday [phone]  [facility]   Pharmacy 8am-8pm Monday-Thursday      8am-6pm Friday  9am-5pm Saturday-Sunday [phone]   Urgent Care 11am-8pm Monday-Friday        9am-5pm Saturday-Sunday [phone]     After hours, weekend or if you need to make an appointment with your primary provider please call [phone].   After Hours nurse advise: call [facility]: [phone]    Medication Refills:  Call your pharmacy and they will forward the refill to us. Please allow 3 business days for your refills to be completed.    Use Red Swoosh (secure email communication and access to your chart) to send your primary care provider a message or make an appointment. Ask someone on your Team how to sign up for Red Swoosh. To log on to Axiom or for more information in [employer] please visit the website at [email or web address].  As of October 8, 2013, all password changes, disabled accounts, or ID changes in Red Swoosh/MyHealth will be done by our Access Services Department.   If you need help with your account or password, call: [phone]. Clinic staff no longer has the ability to change passwords.     Managing Fatigue     Family members can help with meals and chores around the  house.   Fatigue is common. It can be caused by worry, lack of sleep, or poor appetite. Fatigue can also be a sign of anemia, a shortage of red blood cells. You might need medical treatment for anemia. The tips below can help you feel better.  Conserving energy    Keep track of the times of day when you are most tired and plan around them. For instance, if you are more tired in the afternoon, try to get tasks done in the morning.    Decide which tasks are most important. Do those first.    Pass tasks along to others when you need to. Ask for help.    Accept help when it s offered. Tell people what they can do to help. For instance, you may need someone to fix a meal, fold clothes, or put gas in your car.    Plan rest times. You may want to take a nap each day. Just sitting quietly for a few minutes can make you feel more rested.  What you can do to feel better    Relax before you try to sleep. Take a bath or read for a while.    Form a sleep pattern. Go to bed at the same time each night and get up at the same time each morning.    Eat well. Choose foods from all of the food groups each day.    Exercise. Take a brisk walk to help increase your energy.    Avoid caffeine and alcohol. Drink plenty of water or fruit juices instead.  Treating anemia  If you begin to feel more tired than normal, tell your doctor. Fatigue could be a sign of anemia. This problem is fairly common in cancer patients, especially during chemotherapy and radiation treatments. If your red blood cell count is too low, you may get a blood transfusion. In some cases, you may need medicine to increase the number of red blood cells your body makes.  When to call your healthcare provider  Call your healthcare provider if you have:    Shortness of breath or chest pain    A dizzy feeling when you get up from lying or sitting down    Paler skin than normal    Extreme tiredness that is not helped by sleep   Date Last Reviewed: 1/3/2016    3939-0616 [employer]  [employer]. [address] [place] [address], [place] [address]. All rights reserved. This information is not intended as a substitute for professional medical care. Always follow your healthcare professional's instructions.        Fibromyalgia  Fibromyalgia is a chronic condition. It causes pain and tenderness in connective tissues. This causes muscle pain. Often, there are also many tender areas throughout the body. Symptoms may also include stiffness and feelings of numbness and tingling. Symptoms may be worse upon waking up. They may increase with poor sleep, heavy activity, cold or damp weather, anxiety, or stress.  People with fibromyalgia often feel tired. They may have trouble sleeping. Other symptoms include morning stiffness, headaches, and painful menstrual periods. Some people have problems with thinking clearly and changes in memory.  The cause of fibromyalgia is not known. Symptoms are similar to that of other diseases. These include rheumatoid arthritis, low thyroid, chronic fatigue syndrome, and Lyme disease. In some cases, these diseases may occur together.  Fibromyalgia is often treated with medicines. You and your healthcare provider can discuss the medication plan that may work best for you. You may have to try more than one medicine or combination of medicines before you find what works for you.  Home care    If your healthcare provider has prescribed or recommended medicines, take them as directed.    Rest as needed. Try to get enough sleep. If you have trouble sleeping, discuss this with your healthcare provider.     Be active. Regular exercise can help manage symptoms. Some options include walking, swimming, and biking. Strengthening exercises may also be helpful. Start an exercise program gradually. Talk to your healthcare provider about the best ways to be active.    Follow a healthy diet. Limit caffeine and alcohol. If you smoke, ask your healthcare provider for help to stop.    Notice  how your body reacts to stress. Learn to listen to your body signals. This will help you take action before the stress becomes severe.    Learn relaxation techniques. Also consider joining a stress reduction program or class.    Talk to your healthcare provider about trying complementary treatments. These include acupuncture, hypnosis, and biofeedback. Yoga and gael chi may be helpful.    Ask your healthcare provider about cognitive behavioral therapy (CBT). This type of counseling can help people with fibromyalgia cope better with their illness.  Follow-up care  Follow up with your healthcare provider or as advised by our staff. In many cases, fibromyalgia is best treated with a team approach.  This may involve your primary care provider, a rheumatologist, a physical therapist, and a mental health professional.   For more information: National [place] of Arthritis and Musculoskeletal and Skin Diseases (NIAMS)  www.niams.nih.gov 184-300-0155  When to seek medical advice  Contact your healthcare provider if any of the following occurs:    Symptoms getting worse or new symptoms developing    You feel hopeless, helpless, or lose interest in day-to-day life  Date Last Reviewed: 4/26/2015 2000-2017 [employer]. [address] [place] [address]. All rights reserved. This information is not intended as a substitute for professional medical care. Always follow your healthcare professional's instructions.                Follow-ups after your visit        Follow-up notes from your care team     Return in about 6 months (around 12/19/2017) for medication follow up, Lab Work.      Your next 10 appointments already scheduled     Jun 23, 2017  9:30 AM CDT   MA SCREENING DIGITAL BILATERAL with [id number]   [facility]    [address] [place] [id number] [phone]           Do not use any powder, lotion or deodorant under your arms or  "on your breast. If you do, we will ask you to remove it before your exam.  Wear comfortable, two-piece clothing.  If you have any allergies, tell your care team.  Bring any previous mammograms from other facilities or have them mailed to the breast center.              Who to contact     If you have questions or need follow up information about today's clinic visit or your schedule please contact [facility] directly at [phone].  Normal or non-critical lab and imaging results will be communicated to you by ADVIZEhart, letter or phone within 4 business days after the clinic has received the results. If you do not hear from us within 7 days, please contact the clinic through [employer] or phone. If you have a critical or abnormal lab result, we will notify you by phone as soon as possible.  Submit refill requests through [employer] or call your pharmacy and they will forward the refill request to us. Please allow 3 business days for your refill to be completed.          Additional Information About Your Visit        [employer] Information     [employer] lets you send messages to your doctor, view your test results, renew your prescriptions, schedule appointments and more. To sign up, go to [email or web address] . Click on \"Log in\" on the left side of the screen, which will take you to the Welcome page. Then click on \"Sign up Now\" on the right side of the page.     You will be asked to enter the access code listed below, as well as some personal information. Please follow the directions to create your username and password.     Your access code is: [email or web address]  Expires: 2017  9:02 AM     Your access code will  in 90 days. If you need help or a new code, please call your [facility] or [phone].        Care EveryWhere ID     This is your Care EveryWhere ID. This could be used by other organizations to access your [place] medical records  [id number]        Your Vitals Were     Pulse " "Temperature Height Pulse Oximetry BMI (Body Mass Index)       80 98.6  F (37  C) (Oral) 5' 0.75\" (1.543 m) 97% 25.53 kg/m2        Blood Pressure from Last 3 Encounters:   06/19/17 117/77   01/11/17 110/62   12/27/16 132/80    Weight from Last 3 Encounters:   06/19/17 134 lb (60.8 kg)   01/11/17 132 lb (59.9 kg)   12/27/16 132 lb 12.8 oz (60.2 kg)              We Performed the Following     CBC with platelets     Comprehensive metabolic panel     Lipid panel reflex to direct LDL     TSH with free T4 reflex     UA reflex to Microscopic and Culture     Vitamin D Deficiency        Primary Care Provider Office Phone # Fax #    Mary Rolle [phone]       [facility] [address] [address]  [facility] [address]        Thank you!     Thank you for choosing [facility]  for your care. Our goal is always to provide you with excellent care. Hearing back from our patients is one way we can continue to improve our services. Please take a few minutes to complete the written survey that you may receive in the mail after your visit with us. Thank you!             Your Updated Medication List - Protect others around you: Learn how to safely use, store and throw away your medicines at www.disposemymeds.org.          This list is accurate as of: 6/19/17  9:03 AM.  Always use your most recent med list.                   Brand Name Dispense Instructions for use    acetaminophen 500 MG tablet    TYLENOL     Take 500-1,000 mg by mouth as needed for mild pain       ALPRAZolam 0.25 MG tablet    XANAX    90 tablet    TAKE ONE TABLET BY MOUTH 3 TIMES A DAY AS NEEDED FOR ANXIETY       aspirin 81 MG tablet      Take  by mouth daily. *       CALCIUM PO          cyanocobalamin 1000 MCG tablet    vitamin  B-12     Take 1,000 mcg by mouth daily       Magnesium Oxide -Mg Supplement 250 MG Tabs      Take 500 mg by mouth daily       MULTI-VITAMIN DAILY PO      Take 1 tablet by mouth.       " OMEGA-3 FISH OIL PO      Take 1,200 mg by mouth daily       UNABLE TO FIND      MEDICATION NAME: Mohamud Calm as needed       Vitamin D3 3000 UNITS Tabs      Take 5,000 Units by mouth

## 2017-06-19 NOTE — NURSING NOTE
"Chief Complaint   Patient presents with     Physical       Initial /77 (BP Location: Left arm, Patient Position: Chair, Cuff Size: Adult Regular)  Pulse 80  Temp 98.6  F (37  C) (Oral)  Ht 5' 0.75\" (1.543 m)  Wt 134 lb (60.8 kg)  SpO2 97%  BMI 25.53 kg/m2 Estimated body mass index is 25.53 kg/(m^2) as calculated from the following:    Height as of this encounter: 5' 0.75\" (1.543 m).    Weight as of this encounter: 134 lb (60.8 kg).  Medication Reconciliation: complete     Pa Ruben Lopez MA      "

## 2017-06-19 NOTE — LETTER
[address] [address]  [phone]             June 20, 2017    Whitley Wesley  [address]   [facility] [address]            Dear Whitley Wesley,     Your test results are attached. I am happy to let you know that they are stable and your medications can stay the same.     The blood sugar is normal and you do not have diabetes. The kidney and liver tests were normal. The thyroid test is normal. The vitamin D is still on the high side but is better than last time when it was above range by a lot. We can recheck labs in 1 year. Enclosed are the results.  Results for orders placed or performed in visit on 06/19/17   CBC with platelets   Result Value Ref Range    WBC 7.0 4.0 - 11.0 10e9/L    RBC Count 4.66 3.8 - 5.2 10e12/L    Hemoglobin 14.7 11.7 - 15.7 g/dL    Hematocrit 43.6 35.0 - 47.0 %    MCV 94 78 - 100 fl    MCH 31.5 26.5 - 33.0 pg    MCHC 33.7 31.5 - 36.5 g/dL    RDW 13.1 10.0 - 15.0 %    Platelet Count 239 150 - 450 10e9/L   Comprehensive metabolic panel   Result Value Ref Range    Sodium 144 133 - 144 mmol/L    Potassium 4.3 3.4 - 5.3 mmol/L    Chloride 110 (H) 94 - 109 mmol/L    Carbon Dioxide 27 20 - 32 mmol/L    Anion Gap 7 3 - 14 mmol/L    Glucose 86 70 - 99 mg/dL    Urea Nitrogen 16 7 - 30 mg/dL    Creatinine 0.88 0.52 - 1.04 mg/dL    GFR Estimate 63 >60 mL/min/1.7m2    GFR Estimate If Black 76 >60 mL/min/1.7m2    Calcium 9.2 8.5 - 10.1 mg/dL    Bilirubin Total 0.9 0.2 - 1.3 mg/dL    Albumin 3.6 3.4 - 5.0 g/dL    Protein Total 6.9 6.8 - 8.8 g/dL    Alkaline Phosphatase 70 40 - 150 U/L    ALT 31 0 - 50 U/L    AST 19 0 - 45 U/L   TSH with free T4 reflex   Result Value Ref Range    TSH 4.11 (H) 0.40 - 4.00 mU/L   Vitamin D Deficiency   Result Value Ref Range    Vitamin D Deficiency screening 80 (H) 20 - 75 ug/L   UA reflex to Microscopic and Culture   Result Value Ref Range    Color Urine Yellow     Appearance Urine Clear      Glucose Urine Negative NEG mg/dL    Bilirubin Urine Negative NEG    Ketones Urine Negative NEG mg/dL    Specific Gravity Urine >1.030 1.003 - 1.035    Blood Urine Negative NEG    pH Urine 5.5 5.0 - 7.0 pH    Protein Albumin Urine Negative NEG mg/dL    Urobilinogen Urine 0.2 0.2 - 1.0 EU/dL    Nitrite Urine Negative NEG    Leukocyte Esterase Urine Small (A) NEG    Source Midstream Urine    Lipid panel reflex to direct LDL   Result Value Ref Range    Cholesterol 220 (H) <200 mg/dL    Triglycerides 78 <150 mg/dL    HDL Cholesterol 92 >49 mg/dL    LDL Cholesterol Calculated 112 (H) <100 mg/dL    Non HDL Cholesterol 128 <130 mg/dL   Urine Microscopic   Result Value Ref Range    WBC Urine 2-5 (A) 0 - 2 /HPF    RBC Urine O - 2 0 - 2 /HPF    Squamous Epithelial /LPF Urine Moderate (A) FEW /LPF    Bacteria Urine Few (A) NEG /HPF    Mucous Urine Present (A) NEG /LPF   T4 free   Result Value Ref Range    T4 Free 1.03 0.76 - 1.46 ng/dL       Please call me if you have any questions about these test results or about your care.     Sincerely,     Mary Rolle MD/gianna

## 2017-06-20 ASSESSMENT — PATIENT HEALTH QUESTIONNAIRE - PHQ9: SUM OF ALL RESPONSES TO PHQ QUESTIONS 1-9: 0

## 2017-06-20 ASSESSMENT — ANXIETY QUESTIONNAIRES: GAD7 TOTAL SCORE: 0

## 2017-06-20 NOTE — PROGRESS NOTES
Dear Whitley Wesley,    Your test results are attached. I am happy to let you know that they are stable and your medications can stay the same.    The blood sugar is normal and you do not have diabetes. The kidney and liver tests were normal. The thyroid test is normal. The vitamin D is still on the high side but is better than last time when it was above range by a lot. We can recheck labs in 1 year.     Please call me if you have any questions about these test results or about your care.    Sincerely,    Mary Rolle MD

## 2017-06-23 ENCOUNTER — RADIANT APPOINTMENT (OUTPATIENT)
Dept: MAMMOGRAPHY | Facility: CLINIC | Age: 72
End: 2017-06-23
Attending: FAMILY MEDICINE
Payer: COMMERCIAL

## 2017-06-23 DIAGNOSIS — Z12.31 VISIT FOR SCREENING MAMMOGRAM: ICD-10-CM

## 2017-06-23 PROCEDURE — G0202 SCR MAMMO BI INCL CAD: HCPCS | Mod: TC

## 2017-07-07 DIAGNOSIS — F41.1 GENERALIZED ANXIETY DISORDER: ICD-10-CM

## 2017-07-07 RX ORDER — ALPRAZOLAM 0.25 MG
0.25 TABLET ORAL 3 TIMES DAILY PRN
Qty: 90 TABLET | Refills: 1 | Status: SHIPPED | OUTPATIENT
Start: 2017-07-07 | End: 2018-04-19

## 2017-07-07 NOTE — TELEPHONE ENCOUNTER
ALPRAZolam (XANAX) 0.25 MG tablet      Last Written Prescription Date:  04/07/17  Last Fill Quantity: 90,   # refills: 0  Last Office Visit with [facility], [facility] or [facility] prescribing provider: 06/19/17  Future Office visit:       Routing refill request to provider for review/approval because:  Drug not on the [facility], [facility] or [facility] refill protocol or controlled substance      [facility]

## 2017-10-25 ENCOUNTER — TELEPHONE (OUTPATIENT)
Dept: FAMILY MEDICINE | Facility: CLINIC | Age: 72
End: 2017-10-25

## 2017-11-08 PROBLEM — M54.12 CERVICAL RADICULOPATHY: Status: RESOLVED | Noted: 2017-01-12 | Resolved: 2017-11-08

## 2017-11-08 NOTE — PROGRESS NOTES
Patient was seen only for 2 sessions and did not schedule further treatments.  We will resolve this episode at this time.  See initial evaluation for any information needed.

## 2017-12-21 ENCOUNTER — OFFICE VISIT (OUTPATIENT)
Dept: FAMILY MEDICINE | Facility: CLINIC | Age: 72
End: 2017-12-21
Payer: COMMERCIAL

## 2017-12-21 VITALS
BODY MASS INDEX: 25.34 KG/M2 | OXYGEN SATURATION: 96 % | SYSTOLIC BLOOD PRESSURE: 132 MMHG | HEART RATE: 80 BPM | HEIGHT: 61 IN | DIASTOLIC BLOOD PRESSURE: 73 MMHG | RESPIRATION RATE: 12 BRPM | TEMPERATURE: 98.5 F | WEIGHT: 134.2 LBS

## 2017-12-21 DIAGNOSIS — F32.0 MILD MAJOR DEPRESSION (H): ICD-10-CM

## 2017-12-21 DIAGNOSIS — F41.1 GENERALIZED ANXIETY DISORDER: ICD-10-CM

## 2017-12-21 DIAGNOSIS — M62.830 BACK MUSCLE SPASM: ICD-10-CM

## 2017-12-21 DIAGNOSIS — E78.5 HYPERLIPIDEMIA LDL GOAL <160: ICD-10-CM

## 2017-12-21 DIAGNOSIS — M50.30 DDD (DEGENERATIVE DISC DISEASE), CERVICAL: ICD-10-CM

## 2017-12-21 DIAGNOSIS — G44.219 EPISODIC TENSION-TYPE HEADACHE, NOT INTRACTABLE: Primary | ICD-10-CM

## 2017-12-21 PROCEDURE — 99214 OFFICE O/P EST MOD 30 MIN: CPT | Performed by: FAMILY MEDICINE

## 2017-12-21 ASSESSMENT — PAIN SCALES - GENERAL: PAINLEVEL: NO PAIN (0)

## 2017-12-21 NOTE — PATIENT INSTRUCTIONS
At [facility], we strive to deliver an exceptional experience to you, every time we see you.  If you receive a survey in the mail, please send us back your thoughts. We really do value your feedback.    Based on your medical history, these are the current health maintenance/preventive care services that you are due for (some may have been done at this visit.)  Health Maintenance Due   Topic Date Due     EYE EXAM Q1 YEAR  01/26/2017     DEPRESSION ACTION PLAN Q1 YR  03/03/2017     INFLUENZA VACCINE (SYSTEM ASSIGNED)  09/01/2017     PHQ-9 Q6 MONTHS  12/19/2017         Suggested websites for health information:  Www.[facility].org : Up to date and easily searchable information on multiple topics.  Www.medlineplus.gov : medication info, interactive tutorials, watch real surgeries online  Www.familydoctor.org : good info from the Academy of Family Physicians  Www.cdc.gov : public health info, travel advisories, epidemics (H1N1)  Www.aap.org : children's health info, normal development, vaccinations  Www.health.[facility].[place].us : [place] dept of health, public health issues in [place], N1N1    Your care team:                            Family Medicine Internal Medicine   MD Dmitry Wing MD Shantel Branch-Fleming, MD Katya Georgiev PA-C Nam Ho, MD Pediatrics   YI Cullen, MD Sarai Rodgers CNP, MD Deborah Mielke, MD Kim Thein, APRN [facility]      Clinic hours: Monday - Thursday 7 am-7 pm; Fridays 7 am-5 pm.   Urgent care: Monday - Friday 11 am-9 pm; Saturday and Sunday 9 am-5 pm.  Pharmacy : Monday -Thursday 8 am-8 pm; Friday 8 am-6 pm; Saturday and Sunday 9 am-5 pm.     Clinic: [phone]   Pharmacy: [phone]    General Neck and Back Pain    Both neck and back pain are usually caused by injury to the muscles or ligaments of the spine. Sometimes the disks that separate each bone of the spine may cause pain by pressing  on a nearby nerve. Back and neck pain may appear after a sudden twisting or bending force (such as in a car accident), or sometimes after a simple awkward movement. In either case, muscle spasm is often present and adds to the pain.  Acute neck and back pain usually gets better in 1 to 2 weeks. Pain related to disk disease, arthritis in the spinal joints or spinal stenosis (narrowing of the spinal canal) can become chronic and last for months or years.  Back and neck pain are common problems. Most people feel better in 1 or 2 weeks, and most of the rest in 1 to 2 months. Most people can remain active.  People experience and describe pain differently.    Pain can be sharp, stabbing, shooting, aching, cramping, or burning    Movement, standing, bending, lifting, sitting, or walking may worsen the pain    Pain can be localized to one spot or area, or it can be more generalized    Pain can spread or radiate upwards, downwards, to the front, or go down your arms    Muscle spasm may occur.  Most of the time mechanical problems with the muscles or spine cause the pain. it is usually caused by an injury, whether known or not, to the muscles or ligaments. While illnesses can cause back pain, it is usually not caused by a serious illness. Pain is usually related to physical activity, whether sports, exercise, work, or normal activity. Sometimes it can occur without an identifiable cause. This can happen simply by stretching or moving wrong, without noting pain at the time. Other causes include:    Overexertion, lifting, pushing, pulling incorrectly or too aggressively.    Sudden twisting, bending or stretching from an accident (car or fall), or accidental movement.    Poor posture    Poor conditioning, lack of regular exercise    Spinal disc disease or arthritis    Stress    Pregnancy, or illness like appendicitis, bladder or kidney infection, pelvic infections   Home care    For neck pain: Use a comfortable pillow that  supports the head and keeps the spine in a neutral position. The position of the head should not be tilted forward or backward.    When in bed, try to find a position of comfort. A firm mattress is best. Try lying flat on your back with pillows under your knees. You can also try lying on your side with your knees bent up towards your chest and a pillow between your knees.    At first, do not try to stretch out the sore spots. If there is a strain, it is not like the good soreness you get after exercising without an injury. In this case, stretching may make it worse.    Avoid prolonged sitting, long car rides or travel. This puts more stress on the lower back than standing or walking.    During the first 24 to 72 hours after an injury, apply an ice pack to the painful area for 20 minutes and then remove it for 20 minutes over a period of 60 to 90 minutes or several times a day.     You can alternate ice and heat therapies. Talk with your healthcare provider about the best treatment for your back or neck pain. As a safety precaution, do not use a heating pad at bedtime. Sleeping with a heating pad can lead to skin burns or tissue damage.    Therapeutic massage can help relax the back and neck muscles without stretching them.    Be aware of safe lifting methods and do not lift anything over 15 pounds until all the pain is gone.  Medications  Talk to your healthcare provider before using medicine, especially if you have other medical problems or are taking other medicines.    You may use over-the-counter medicine to control pain, unless another pain medicine was prescribed. If you have chronic conditions like diabetes, liver or kidney disease, stomach ulcers,  gastrointestinal bleeding, or are taking blood thinner medicines.    Be careful if you are given pain medicines, narcotics, or medicine for muscle spasm. They can cause drowsiness, and can affect your coordination, reflexes, and judgment. Do not drive or operate  heavy machinery.  Follow-up care  Follow up with your healthcare provider, or as advised. Physical therapy or further tests may be needed.  If X-rays were taken, you will be notified of any new findings that may affect your care.  Call 911  Seek emergency medical care if any of the following occur:    Trouble breathing    Confusion    Very drowsy or trouble awakening    Fainting or loss of consciousness    Rapid or very slow heart rate    Loss of bowel or bladder control  When to seek medical advice  Call your healthcare provider right away if any of these occur:    Pain becomes worse or spreads into your arms or legs    Weakness, numbness or pain in one or both arms or legs    Numbness in the groin area    Difficulty walking    Fever of 100.4 F (38 C) or higher, or as directed by your healthcare provider  Date Last Reviewed: 7/1/2016 2000-2017 [employer]. [address] [place] [id number]. All rights reserved. This information is not intended as a substitute for professional medical care. Always follow your healthcare professional's instructions.        Back Spasm (No Trauma)    Spasm of the back muscles can occur after a sudden forceful twisting or bending force (such as in a car accident), after a simple awkward movement, or after lifting something heavy with poor body positioning. In any case, muscle spasm adds to the pain. Sleeping in an awkward position or on a poor quality mattress can also cause this. Some people respond to emotional stress by tensing the muscles of their back.  Pain that continues may need further evaluation or other types of treatment such as physical therapy.  You don't always need X-rays for the initial evaluation of back pain, unless you had a physical injury such as from a car accident or fall. If your pain continues and doesn't respond to medical treatment, X-rays and other tests may then be done.   Home care    As soon as possible, start sitting or walking  again to avoid problems from prolonged bed rest (muscle weakness, worsening back stiffness and pain, blood clots in the legs).    When in bed, try to find a position of comfort. A firm mattress is best. Try lying flat on your back with pillows under your knees. You can also try lying on your side with your knees bent up toward your chest and a pillow between your knees.    Avoid prolonged sitting, long car rides, or travel. This puts more stress on the lower back than standing or walking.     During the first 24 to 72 hours after an injury or flare-up, apply an ice pack to the painful area for 20 minutes, then remove it for 20 minutes. Do this over a period of 60 to 90 minutes or several times a day. This will reduce swelling and pain. Always wrap ice packs in a thin towel.    You can start with ice, then switch to heat. Heat (hot shower, hot bath, or heating pad) reduces pain, and works well for muscle spasms. Apply heat to the painful area for 20 minutes, then remove it for 20 minutes. Do this over a period of 60 to 90 minutes or several times a day. Do not sleep on a heating pad as it can burn or damage skin.    Alternate ice and heat therapies.    Be aware of safe lifting methods and do not lift anything over 15 pounds until all the pain is gone.  Gentle stretching will help your back heal faster. Do this simple routine 2 to 3 times a day until your back is feeling better.    Lie on your back with your knees bent and both feet on the ground    Slowly raise your left knee to your chest as you flatten your lower back against the floor. Hold for 20 to 30 seconds.    Relax and repeat the exercise with your right knee.    Do 2 to 3 of these exercises for each leg.    Repeat, hugging both knees to your chest at the same time.    Do not bounce, but use a gentle pull.  Medicines  Talk to your doctor before using medicine, especially if you have other medical problems or are taking other medicines.  You may use  "acetaminophen or ibuprofen to control pain, unless your healthcare provider prescribed another pain medicine. If you have a chronic condition such as diabetes, liver or kidney disease, stomach ulcer, or gastrointestinal bleeding, or are taking blood thinners, talk with your healthcare provider before taking any medicines.  Be careful if you are given prescription pain medicine, narcotics, or medicine for muscle spasm. They can cause drowsiness, affect your coordination, reflexes, or judgment. Do not drive or operate heavy machinery when taking these medicines. Take pain medicine only as prescribed by your healthcare provider.  Follow-up care  Follow up with your doctor, or as advised. Physical therapy or further tests may be needed.  If X-rays were taken, they may be reviewed by a radiologist. You will be notified of any new findings that may affect your care.  Call 911  Seek emergency medical care if any of these occur:    Trouble breathing    Confusion    Drowsiness or trouble awakening    Fainting or loss of consciousness    Rapid or very slow heart rate    Loss of bowel or bladder control  When to seek medical advice  Call your healthcare provider right away if any of these occur:    Pain becomes worse or spreads to your legs    Weakness or numbness in one or both legs    Numbness in the groin or genital area    Unexplained fever over 100.4 F (38.0 C)    Burning or pain when passing urine  Date Last Reviewed: 6/1/2016 2000-2017 [employer]. [address] [place] [address]. All rights reserved. This information is not intended as a substitute for professional medical care. Always follow your healthcare professional's instructions.        * Tension Headache    Muscle Tension Headache (also called \"stress headache\") is a very common cause of head pain. Under stress, some people tense the muscles of their shoulder, neck and scalp without knowing it. If this lasts long enough, a headache " can occur. These headaches can be very painful and last for hours or even days.  Home Care:    If you were given pain medicine for this headache, do not drive yourself home. Arrange for a ride, instead. When you get home, try to sleep. You should feel much better when you wake up.    Heat to the back of your neck may relieve neck spasm.    Drink only clear liquids or eat a very light diet to avoid nausea/vomiting until symptoms improve.  Preventing Future Headaches    Identify the sources of stress in your life. These may not be obvious! Learn new ways to handle your stress, such as regular exercise, biofeedback, self-hypnosis and meditation. For more information about this, consult your doctor or go to a local bookstore and review the many books and tapes on this subject.    At the first sign of a tension headache, take time out if possible. Remove yourself from the stressful situation, find a quiet comfortable place to sit or lie down and let yourself relax. Heat and deep massage of the tight areas in the neck and shoulders may help reduce muscle spasm. Medicine, such as ibuprofen (Advil or Motrin) or a prescribed muscle relaxant may be helpful at this point.  Follow Up with your doctor if the headache is not better within the next 24 hours. If you have frequent headaches you should discuss a treatment plan with your primary care doctor. Ask if you can have medicine to take at home the next time you get a bad headache. This may avoid the need for a visit to the emergency department in the future. Poorly controlled chronic headaches may require a referral to a neurologist (headache specialist).  Call your Doctor Or Get Prompt Medical Attention if any of the following occur:    Worsening of your head pain or no improvement within 24 hours    Repeated vomiting (unable to keep liquids down)    Fever of 100.4 F (38.0 C) or higher, or as directed by your healthcare provider    Stiff neck    Extreme drowsiness, confusion  or fainting    Dizziness, vertigo (dizziness with spinning sensation)    Weakness of an arm or leg or one side of the face    Difficulty with speech or vision    2935-1992 [employer]. [address] [place] [address], [place] [address]. All rights reserved. This information is not intended as a substitute for professional medical care. Always follow your healthcare professional's instructions.  This information has been modified by your health care provider with permission from the publisher.

## 2017-12-21 NOTE — PROGRESS NOTES
SUBJECTIVE:   Whitley Wesley is a 72 year old female who presents to clinic today for the following health issues:    Medication Followup of ALL    Taking Medication as prescribed: yes    Side Effects:  None    Medication Helping Symptoms:  yes     Depression and Anxiety Follow-Up    Status since last visit: No change    Other associated symptoms: May have some short term memory loss    Complicating factors:  is in a wheelchair now and not able to get out much.     Significant life event: No     Current substance abuse: None    PHQ-9 Score and MyChart F/U Questions 7/15/2016 12/27/2016 6/19/2017   Total Score 4 0 0   Q9: Suicide Ideation Not at all Not at all Not at all     STEFF-7 SCORE 7/15/2016 12/27/2016 6/19/2017   Total Score - - -   Total Score 1 0 0       PHQ-9  English  PHQ-9   Any Language  GAD7  Suicide Assessment Five-step Evaluation and Treatment (SAFE-T)  Migraine Follow-Up    Headaches symptoms:  Tension in left side of head and neck     Frequency: 1-2 times a week     Duration of headaches: 4-5 hours    Able to do normal daily activities/work with migraines: Yes    Rescue/Relief medication:Tylenol              Effectiveness: moderate relief    Preventative medication: None    Neurologic complications: No new stroke-like symptoms, loss of vision or speech, numbness or weakness    In the past 4 weeks, how often have you gone to Urgent Care or the emergency room because of your headaches?  0       -------------------------------------    Problem list and histories reviewed & adjusted, as indicated.  Additional history: as documented    Patient Active Problem List   Diagnosis     CARDIOVASCULAR SCREENING; LDL GOAL LESS THAN 130     Mild major depression (H)     Advanced directives, counseling/discussion     Colon polyps     Cataracts, both eyes     Generalized anxiety disorder     Caregiver stress     Cervicalgia     Cervical spondylosis with myelopathy     DDD (degenerative disc disease), cervical  "    Hyperlipidemia LDL goal <160     Past Surgical History:   Procedure Laterality Date     ANKLE SURGERY Left 2007    Left ankle       Social History   Substance Use Topics     Smoking status: Never Smoker     Smokeless tobacco: Never Used     Alcohol use No     Family History   Problem Relation Age of Onset     CANCER Mother      \"urinary\"     CANCER Brother      lung     DIABETES No family hx of      Hypertension No family hx of      CEREBROVASCULAR DISEASE No family hx of      Thyroid Disease No family hx of      Glaucoma No family hx of      Macular Degeneration No family hx of          Current Outpatient Prescriptions   Medication Sig Dispense Refill     Fish Oil-Cholecalciferol (FISH OIL + D3) 8508-0777 MG-UNIT CAPS Take 1,000 mg by mouth daily       ALPRAZolam (XANAX) 0.25 MG tablet Take 1 tablet (0.25 mg) by mouth 3 times daily as needed for anxiety 90 tablet 1     CALCIUM PO        cyanocobalamin (VITAMIN  B-12) 1000 MCG tablet Take 1,000 mcg by mouth daily       acetaminophen (TYLENOL) 500 MG tablet Take 500-1,000 mg by mouth as needed for mild pain       Cholecalciferol (VITAMIN D3) 3000 UNITS TABS Take 5,000 Units by mouth       aspirin 81 MG tablet Take  by mouth daily. *       Multiple Vitamin (MULTI-VITAMIN DAILY PO) Take 1 tablet by mouth.       Magnesium Oxide -Mg Supplement 250 MG TABS Take 500 mg by mouth daily        Allergies   Allergen Reactions     No Known Drug Allergies      Recent Labs   Lab Test  06/19/17   0906  12/27/16   1635  01/12/16   1546   03/04/14   1019  01/07/13   0845 08/08/12  03/10/12   LDL  112*   --    --    --    --   111   --    --   101   HDL  92   --    --    --    --   83   --    --   96   TRIG  78   --    --    --    --   73  107   --   52   ALT  31   --    --    --   35   --   17   --    --    CR  0.88  0.72  0.86   < >  0.67  0.80  0.79   < >   --    GFRESTIMATED  63  80  65   < >  87  71  77   < >   --    GFRESTBLACK  76  >90   GFR Calc    79   < > " " >90  86  89   < >   --    POTASSIUM  4.3  3.9  3.8   < >  3.8  4.0  4   < >   --    TSH  4.11*   --   3.39   --   4.85  4.31  3.69   --   3.80    < > = values in this interval not displayed.      BP Readings from Last 3 Encounters:   12/21/17 132/73   06/19/17 117/77   01/11/17 110/62    Wt Readings from Last 3 Encounters:   12/21/17 134 lb 3.2 oz (60.9 kg)   06/19/17 134 lb (60.8 kg)   01/11/17 132 lb (59.9 kg)                  Labs reviewed in EPIC          Reviewed and updated as needed this [facility]  Meds  Med Hx  Surg Hx  Fam Hx  Soc Hx      Reviewed and updated as needed this visit by Provider         ROS:  Constitutional, HEENT, cardiovascular, pulmonary, gi and gu systems are negative, except as otherwise noted.      OBJECTIVE:   /73 (BP Location: Right arm, Patient Position: Chair, Cuff Size: Adult Large)  Pulse 80  Temp 98.5  F (36.9  C) (Oral)  Resp 12  Ht 5' 0.75\" (1.543 m)  Wt 134 lb 3.2 oz (60.9 kg)  SpO2 96%  BMI 25.57 kg/m2  Body mass index is 25.57 kg/(m^2).  GENERAL APPEARANCE: healthy, alert and no distress  EYES: Eyes grossly normal to inspection, PERRL and conjunctivae and sclerae normal  HENT: ear canals and TM's normal, nose and mouth without ulcers or lesions, oropharynx clear and oral mucous membranes moist  NECK: no adenopathy, no asymmetry, masses, or scars and thyroid normal to palpation  RESP: lungs clear to auscultation - no rales, rhonchi or wheezes  CV: regular rates and rhythm, normal S1 S2, no S3 or S4, no murmur, click or rub, no peripheral edema and peripheral pulses strong  ABDOMEN: soft, nontender, no hepatosplenomegaly, no masses and bowel sounds normal  MS: no musculoskeletal defects are noted and gait is age appropriate without ataxia  SKIN: no suspicious lesions or rashes  NEURO: Normal strength and tone, sensory exam grossly normal, mentation intact and speech normal  PSYCH: mentation appears normal and affect normal/bright " "    Diagnostic Test Results:  No results found for this or any previous visit (from the past 24 hour(s)).    ASSESSMENT/PLAN:         Tobacco Cessation:   reports that she has never smoked. She has never used smokeless tobacco.      BMI:   Estimated body mass index is 25.57 kg/(m^2) as calculated from the following:    Height as of this encounter: 5' 0.75\" (1.543 m).    Weight as of this encounter: 134 lb 3.2 oz (60.9 kg).               ICD-10-CM    1. Episodic tension-type headache, not intractable G44.219 tiZANidine (ZANAFLEX) 4 MG tablet three times a day as needed for headache or back spasm. More related to activity and tension. Follow up if not improving   2. Mild major depression (H) F32.0 Better on medication but seems to be not getting out or staying very active.    3. Generalized anxiety disorder F41.1 Xanax as needed.    4. DDD (degenerative disc disease), cervical M50.30 Stable    5. Hyperlipidemia LDL goal <160 E78.5 Recheck 6 months   6. Back muscle spasm M62.830 tiZANidine (ZANAFLEX) 4 MG tablet       FUTURE LABS:       - Schedule fasting labs in 6 months  FUTURE APPOINTMENTS:       - Follow-up visit in 6 months or sooner if any questions or concerns.   Regular exercise  See Patient Instructions    Mary Rolle MD  [facility]  "

## 2017-12-21 NOTE — MR AVS SNAPSHOT
After Visit Summary   12/21/2017    Whitley Wesley    MRN: [id number]           Patient Information     Date Of Birth          1945        Visit Information        Provider Department      12/21/2017 11:00 AM Mary Rolle MD [facility]        Today's Diagnoses     Need for prophylactic vaccination and inoculation against influenza    -  1    Mild major depression (H)        Generalized anxiety disorder        DDD (degenerative disc disease), cervical        Hyperlipidemia LDL goal <160        Back muscle spasm        Episodic tension-type headache, not intractable          Care Instructions    At [facility], we strive to deliver an exceptional experience to you, every time we see you.  If you receive a survey in the mail, please send us back your thoughts. We really do value your feedback.    Based on your medical history, these are the current health maintenance/preventive care services that you are due for (some may have been done at this visit.)  Health Maintenance Due   Topic Date Due     EYE EXAM Q1 YEAR  01/26/2017     DEPRESSION ACTION PLAN Q1 YR  03/03/2017     INFLUENZA VACCINE (SYSTEM ASSIGNED)  09/01/2017     PHQ-9 Q6 MONTHS  12/19/2017         Suggested websites for health information:  Www.Yammer : Up to date and easily searchable information on multiple topics.  Www.medlineplus.gov : medication info, interactive tutorials, watch real surgeries online  Www.familydoctor.org : good info from the Academy of Family Physicians  Www.cdc.gov : public health info, travel advisories, epidemics (H1N1)  Www.aap.org : children's health info, normal development, vaccinations  Www.health.state.[place].us : [place] dept of health, public health issues in [place], N1N1    Your care team:                            Family Medicine Internal Medicine   MD Dmitry Wing MD Shantel Branch-Fleming, MD Katya Georgiev PA-C Nam Ho, MD Pediatrics    YI Cullen, MD Sarai Rodgers CNP, MD Deborah Mielke, MD Kim Thein, APRN CNP      Clinic hours: Monday - Thursday 7 am-7 pm; Fridays 7 am-5 pm.   Urgent care: Monday - Friday 11 am-9 pm; Saturday and Sunday 9 am-5 pm.  Pharmacy : Monday -Thursday 8 am-8 pm; Friday 8 am-6 pm; Saturday and Sunday 9 am-5 pm.     Clinic: [phone]   Pharmacy: [phone]    General Neck and Back Pain    Both neck and back pain are usually caused by injury to the muscles or ligaments of the spine. Sometimes the disks that separate each bone of the spine may cause pain by pressing on a nearby nerve. Back and neck pain may appear after a sudden twisting or bending force (such as in a car accident), or sometimes after a simple awkward movement. In either case, muscle spasm is often present and adds to the pain.  Acute neck and back pain usually gets better in 1 to 2 weeks. Pain related to disk disease, arthritis in the spinal joints or spinal stenosis (narrowing of the spinal canal) can become chronic and last for months or years.  Back and neck pain are common problems. Most people feel better in 1 or 2 weeks, and most of the rest in 1 to 2 months. Most people can remain active.  People experience and describe pain differently.    Pain can be sharp, stabbing, shooting, aching, cramping, or burning    Movement, standing, bending, lifting, sitting, or walking may worsen the pain    Pain can be localized to one spot or area, or it can be more generalized    Pain can spread or radiate upwards, downwards, to the front, or go down your arms    Muscle spasm may occur.  Most of the time mechanical problems with the muscles or spine cause the pain. it is usually caused by an injury, whether known or not, to the muscles or ligaments. While illnesses can cause back pain, it is usually not caused by a serious illness. Pain is usually related to physical activity,  whether sports, exercise, work, or normal activity. Sometimes it can occur without an identifiable cause. This can happen simply by stretching or moving wrong, without noting pain at the time. Other causes include:    Overexertion, lifting, pushing, pulling incorrectly or too aggressively.    Sudden twisting, bending or stretching from an accident (car or fall), or accidental movement.    Poor posture    Poor conditioning, lack of regular exercise    Spinal disc disease or arthritis    Stress    Pregnancy, or illness like appendicitis, bladder or kidney infection, pelvic infections   Home care    For neck pain: Use a comfortable pillow that supports the head and keeps the spine in a neutral position. The position of the head should not be tilted forward or backward.    When in bed, try to find a position of comfort. A firm mattress is best. Try lying flat on your back with pillows under your knees. You can also try lying on your side with your knees bent up towards your chest and a pillow between your knees.    At first, do not try to stretch out the sore spots. If there is a strain, it is not like the good soreness you get after exercising without an injury. In this case, stretching may make it worse.    Avoid prolonged sitting, long car rides or travel. This puts more stress on the lower back than standing or walking.    During the first 24 to 72 hours after an injury, apply an ice pack to the painful area for 20 minutes and then remove it for 20 minutes over a period of 60 to 90 minutes or several times a day.     You can alternate ice and heat therapies. Talk with your healthcare provider about the best treatment for your back or neck pain. As a safety precaution, do not use a heating pad at bedtime. Sleeping with a heating pad can lead to skin burns or tissue damage.    Therapeutic massage can help relax the back and neck muscles without stretching them.    Be aware of safe lifting methods and do not lift  anything over 15 pounds until all the pain is gone.  Medications  Talk to your healthcare provider before using medicine, especially if you have other medical problems or are taking other medicines.    You may use over-the-counter medicine to control pain, unless another pain medicine was prescribed. If you have chronic conditions like diabetes, liver or kidney disease, stomach ulcers,  gastrointestinal bleeding, or are taking blood thinner medicines.    Be careful if you are given pain medicines, narcotics, or medicine for muscle spasm. They can cause drowsiness, and can affect your coordination, reflexes, and judgment. Do not drive or operate heavy machinery.  Follow-up care  Follow up with your healthcare provider, or as advised. Physical therapy or further tests may be needed.  If X-rays were taken, you will be notified of any new findings that may affect your care.  Call 911  Seek emergency medical care if any of the following occur:    Trouble breathing    Confusion    Very drowsy or trouble awakening    Fainting or loss of consciousness    Rapid or very slow heart rate    Loss of bowel or bladder control  When to seek medical advice  Call your healthcare provider right away if any of these occur:    Pain becomes worse or spreads into your arms or legs    Weakness, numbness or pain in one or both arms or legs    Numbness in the groin area    Difficulty walking    Fever of 100.4 F (38 C) or higher, or as directed by your healthcare provider  Date Last Reviewed: 7/1/2016 2000-2017 [employer]. [address] [place] [address]. All rights reserved. This information is not intended as a substitute for professional medical care. Always follow your healthcare professional's instructions.        Back Spasm (No Trauma)    Spasm of the back muscles can occur after a sudden forceful twisting or bending force (such as in a car accident), after a simple awkward movement, or after lifting  something heavy with poor body positioning. In any case, muscle spasm adds to the pain. Sleeping in an awkward position or on a poor quality mattress can also cause this. Some people respond to emotional stress by tensing the muscles of their back.  Pain that continues may need further evaluation or other types of treatment such as physical therapy.  You don't always need X-rays for the initial evaluation of back pain, unless you had a physical injury such as from a car accident or fall. If your pain continues and doesn't respond to medical treatment, X-rays and other tests may then be done.   Home care    As soon as possible, start sitting or walking again to avoid problems from prolonged bed rest (muscle weakness, worsening back stiffness and pain, blood clots in the legs).    When in bed, try to find a position of comfort. A firm mattress is best. Try lying flat on your back with pillows under your knees. You can also try lying on your side with your knees bent up toward your chest and a pillow between your knees.    Avoid prolonged sitting, long car rides, or travel. This puts more stress on the lower back than standing or walking.     During the first 24 to 72 hours after an injury or flare-up, apply an ice pack to the painful area for 20 minutes, then remove it for 20 minutes. Do this over a period of 60 to 90 minutes or several times a day. This will reduce swelling and pain. Always wrap ice packs in a thin towel.    You can start with ice, then switch to heat. Heat (hot shower, hot bath, or heating pad) reduces pain, and works well for muscle spasms. Apply heat to the painful area for 20 minutes, then remove it for 20 minutes. Do this over a period of 60 to 90 minutes or several times a day. Do not sleep on a heating pad as it can burn or damage skin.    Alternate ice and heat therapies.    Be aware of safe lifting methods and do not lift anything over 15 pounds until all the pain is gone.  Gentle stretching  will help your back heal faster. Do this simple routine 2 to 3 times a day until your back is feeling better.    Lie on your back with your knees bent and both feet on the ground    Slowly raise your left knee to your chest as you flatten your lower back against the floor. Hold for 20 to 30 seconds.    Relax and repeat the exercise with your right knee.    Do 2 to 3 of these exercises for each leg.    Repeat, hugging both knees to your chest at the same time.    Do not bounce, but use a gentle pull.  Medicines  Talk to your doctor before using medicine, especially if you have other medical problems or are taking other medicines.  You may use acetaminophen or ibuprofen to control pain, unless your healthcare provider prescribed another pain medicine. If you have a chronic condition such as diabetes, liver or kidney disease, stomach ulcer, or gastrointestinal bleeding, or are taking blood thinners, talk with your healthcare provider before taking any medicines.  Be careful if you are given prescription pain medicine, narcotics, or medicine for muscle spasm. They can cause drowsiness, affect your coordination, reflexes, or judgment. Do not drive or operate heavy machinery when taking these medicines. Take pain medicine only as prescribed by your healthcare provider.  Follow-up care  Follow up with your doctor, or as advised. Physical therapy or further tests may be needed.  If X-rays were taken, they may be reviewed by a radiologist. You will be notified of any new findings that may affect your care.  Call 911  Seek emergency medical care if any of these occur:    Trouble breathing    Confusion    Drowsiness or trouble awakening    Fainting or loss of consciousness    Rapid or very slow heart rate    Loss of bowel or bladder control  When to seek medical advice  Call your healthcare provider right away if any of these occur:    Pain becomes worse or spreads to your legs    Weakness or numbness in one or both  "legs    Numbness in the groin or genital area    Unexplained fever over 100.4 F (38.0 C)    Burning or pain when passing urine  Date Last Reviewed: 6/1/2016 2000-2017 [employer]. [address] [place] [address], [place] [address]. All rights reserved. This information is not intended as a substitute for professional medical care. Always follow your healthcare professional's instructions.        * Tension Headache    Muscle Tension Headache (also called \"stress headache\") is a very common cause of head pain. Under stress, some people tense the muscles of their shoulder, neck and scalp without knowing it. If this lasts long enough, a headache can occur. These headaches can be very painful and last for hours or even days.  Home Care:    If you were given pain medicine for this headache, do not drive yourself home. Arrange for a ride, instead. When you get home, try to sleep. You should feel much better when you wake up.    Heat to the back of your neck may relieve neck spasm.    Drink only clear liquids or eat a very light diet to avoid nausea/vomiting until symptoms improve.  Preventing Future Headaches    Identify the sources of stress in your life. These may not be obvious! Learn new ways to handle your stress, such as regular exercise, biofeedback, self-hypnosis and meditation. For more information about this, consult your doctor or go to a local bookstore and review the many books and tapes on this subject.    At the first sign of a tension headache, take time out if possible. Remove yourself from the stressful situation, find a quiet comfortable place to sit or lie down and let yourself relax. Heat and deep massage of the tight areas in the neck and shoulders may help reduce muscle spasm. Medicine, such as ibuprofen (Advil or Motrin) or a prescribed muscle relaxant may be helpful at this point.  Follow Up with your doctor if the headache is not better within the next 24 hours. If you have frequent " headaches you should discuss a treatment plan with your primary care doctor. Ask if you can have medicine to take at home the next time you get a bad headache. This may avoid the need for a visit to the emergency department in the future. Poorly controlled chronic headaches may require a referral to a neurologist (headache specialist).  Call your Doctor Or Get Prompt Medical Attention if any of the following occur:    Worsening of your head pain or no improvement within 24 hours    Repeated vomiting (unable to keep liquids down)    Fever of 100.4 F (38.0 C) or higher, or as directed by your healthcare provider    Stiff neck    Extreme drowsiness, confusion or fainting    Dizziness, vertigo (dizziness with spinning sensation)    Weakness of an arm or leg or one side of the face    Difficulty with speech or vision    0838-9804 [employer]. [address] [place] [address]. All rights reserved. This information is not intended as a substitute for professional medical care. Always follow your healthcare professional's instructions.  This information has been modified by your health care provider with permission from the publisher.                Follow-ups after your visit        Follow-up notes from your care team     Return in about 6 months (around 6/21/2018).      Who to contact     If you have questions or need follow up information about today's clinic visit or your schedule please contact [facility] directly at [phone].  Normal or non-critical lab and imaging results will be communicated to you by MyChart, letter or phone within 4 business days after the clinic has received the results. If you do not hear from us within 7 days, please contact the clinic through MyChart or phone. If you have a critical or abnormal lab result, we will notify you by phone as soon as possible.  Submit refill requests through [employer] or call your pharmacy and they will forward the refill  "request to us. Please allow 3 business days for your refill to be completed.          Additional Information About Your Visit        [employer] [facility] Information     [employer] lets you send messages to your doctor, view your test results, renew your prescriptions, schedule appointments and more. To sign up, go to [email or web address] . Click on \"Log in\" on the left side of the screen, which will take you to the Welcome page. Then click on \"Sign up Now\" on the right side of the page.     You will be asked to enter the access code listed below, as well as some personal information. Please follow the directions to create your username and password.     Your access code is: [email or web address]  Expires: 3/21/2018 11:36 AM     Your access code will  in 90 days. If you need help or a new code, please call your [facility] clinic or [phone].        Care EveryWhere ID     This is your Care EveryWhere ID. This could be used by other organizations to access your [facility] medical records  [id number]        Your Vitals Were     Pulse Temperature Respirations Height Pulse Oximetry BMI (Body Mass Index)    80 98.5  F (36.9  C) (Oral) 12 5' 0.75\" (1.543 m) 96% 25.57 kg/m2       Blood Pressure from Last 3 Encounters:   17 132/73   17 117/77   17 110/62    Weight from Last 3 Encounters:   17 134 lb 3.2 oz (60.9 kg)   17 134 lb (60.8 kg)   17 132 lb (59.9 kg)              Today, you had the following     No orders found for display         Today's Medication Changes          These changes are accurate as of: 17 11:36 AM.  If you have any questions, ask your nurse or doctor.               Start taking these medicines.        Dose/Directions    tiZANidine 4 MG tablet   Commonly known as:  ZANAFLEX   Used for:  Back muscle spasm, Episodic tension-type headache, not intractable   Started by:  Mary Rolle MD        Dose:  4-8 mg   Take 1-2 tablets (4-8 mg) by mouth 3 times daily as needed for " muscle spasms   Quantity:  30 tablet   Refills:  3            Where to get your medicines      [place] [facility] [place] [address]  [address], [facility] [address]     Phone:  [phone]     tiZANidine 4 MG tablet                Primary Care Provider Office Phone # Fax #    Mary Dena Rolle [phone]       [address]  [facility] [address]        Equal Access to Services     [facility]: Hadii aad ku hadasho Soomaali, waaxda luqadaha, qaybta kaalmada adeegyada, waxay idiin hayaan adeeg kharash yo . So [facility] [phone].    ATENCIÓN: Si habla español, tiene a lentz disposición servicios gratuitos de asistencia lingüística. [facility] [phone].    We comply with applicable federal civil rights laws and [place] laws. We do not discriminate on the basis of race, color, national origin, age, disability, sex, sexual orientation, or gender identity.            Thank you!     Thank you for choosing [facility]  for your care. Our goal is always to provide you with excellent care. Hearing back from our patients is one way we can continue to improve our services. Please take a few minutes to complete the written survey that you may receive in the mail after your visit with us. Thank you!             Your Updated Medication List - Protect others around you: Learn how to safely use, store and throw away your medicines at www.disposemymeds.org.          This list is accurate as of: 12/21/17 11:36 AM.  Always use your most recent med list.                   Brand Name Dispense Instructions for use Diagnosis    acetaminophen 500 MG tablet    TYLENOL     Take 500-1,000 mg by mouth as needed for mild pain        ALPRAZolam 0.25 MG tablet    XANAX    90 tablet    Take 1 tablet (0.25 mg) by mouth 3 times daily as needed for anxiety    Generalized anxiety disorder       aspirin 81 MG tablet      Take  by  mouth daily. *        CALCIUM PO           cyanocobalamin 1000 MCG tablet    vitamin  B-12     Take 1,000 mcg by mouth daily        FISH OIL + D3 4044-6810 MG-UNIT Caps      Take 1,000 mg by mouth daily        Magnesium Oxide -Mg Supplement 250 MG Tabs      Take 500 mg by mouth daily        MULTI-VITAMIN DAILY PO      Take 1 tablet by mouth.        tiZANidine 4 MG tablet    ZANAFLEX    30 tablet    Take 1-2 tablets (4-8 mg) by mouth 3 times daily as needed for muscle spasms    Back muscle spasm, Episodic tension-type headache, not intractable       Vitamin D3 3000 UNITS Tabs      Take 5,000 Units by mouth

## 2018-01-16 NOTE — TELEPHONE ENCOUNTER
Faxed RX April 7, 2017 to fax number [phone]    Right Fax confirmed at 2:47 PM    ARIAS Palomares MA          
Prescription signed and put in box/printer. Dr.Deborah Rolle   
Xanax      Last Written Prescription Date: 12/27/16  Last Fill Quantity: 90,  # refills: 0   [facility] Office Visit with [facility] or [facility] prescribing provider: 012/27/16                                             Routing refill request to provider for review/approval because:  Drug not on the [facility] refill protocol   Albina Meyer RN          
abdominal pain

## 2018-04-15 ENCOUNTER — HEALTH MAINTENANCE LETTER (OUTPATIENT)
Age: 73
End: 2018-04-15

## 2018-04-19 DIAGNOSIS — F41.1 GENERALIZED ANXIETY DISORDER: ICD-10-CM

## 2018-04-19 RX ORDER — ALPRAZOLAM 0.25 MG
TABLET ORAL
Qty: 90 TABLET | Refills: 1 | Status: SHIPPED | OUTPATIENT
Start: 2018-04-19 | End: 2018-09-30

## 2018-06-26 ENCOUNTER — RADIANT APPOINTMENT (OUTPATIENT)
Dept: MAMMOGRAPHY | Facility: CLINIC | Age: 73
End: 2018-06-26
Attending: FAMILY MEDICINE
Payer: COMMERCIAL

## 2018-06-26 DIAGNOSIS — Z12.31 VISIT FOR SCREENING MAMMOGRAM: ICD-10-CM

## 2018-06-26 PROCEDURE — 77067 SCR MAMMO BI INCL CAD: CPT | Mod: TC

## 2018-07-26 ENCOUNTER — OFFICE VISIT (OUTPATIENT)
Dept: FAMILY MEDICINE | Facility: CLINIC | Age: 73
End: 2018-07-26
Payer: COMMERCIAL

## 2018-07-26 VITALS
OXYGEN SATURATION: 96 % | SYSTOLIC BLOOD PRESSURE: 121 MMHG | DIASTOLIC BLOOD PRESSURE: 77 MMHG | HEART RATE: 69 BPM | WEIGHT: 130 LBS | BODY MASS INDEX: 25.52 KG/M2 | RESPIRATION RATE: 20 BRPM | HEIGHT: 60 IN | TEMPERATURE: 98.7 F

## 2018-07-26 DIAGNOSIS — Z12.11 SCREEN FOR COLON CANCER: ICD-10-CM

## 2018-07-26 DIAGNOSIS — F32.0 MILD MAJOR DEPRESSION (H): ICD-10-CM

## 2018-07-26 DIAGNOSIS — Z13.5 SCREENING FOR DIABETIC RETINOPATHY: ICD-10-CM

## 2018-07-26 DIAGNOSIS — M47.12 CERVICAL SPONDYLOSIS WITH MYELOPATHY: ICD-10-CM

## 2018-07-26 DIAGNOSIS — Z00.00 NORMAL PHYSICAL EXAM: Primary | ICD-10-CM

## 2018-07-26 DIAGNOSIS — R53.82 CHRONIC FATIGUE: ICD-10-CM

## 2018-07-26 DIAGNOSIS — Z13.1 SCREENING FOR DIABETES MELLITUS: ICD-10-CM

## 2018-07-26 DIAGNOSIS — F41.1 GENERALIZED ANXIETY DISORDER: ICD-10-CM

## 2018-07-26 DIAGNOSIS — E78.5 HYPERLIPIDEMIA LDL GOAL <160: ICD-10-CM

## 2018-07-26 DIAGNOSIS — R79.89 ELEVATED TSH: ICD-10-CM

## 2018-07-26 LAB
CHOLEST SERPL-MCNC: 192 MG/DL
CREAT SERPL-MCNC: 0.72 MG/DL (ref 0.52–1.04)
GFR SERPL CREATININE-BSD FRML MDRD: 79 ML/MIN/1.7M2
GLUCOSE SERPL-MCNC: 82 MG/DL (ref 70–99)
HDLC SERPL-MCNC: 69 MG/DL
HGB BLD-MCNC: 13.8 G/DL (ref 11.7–15.7)
LDLC SERPL CALC-MCNC: 101 MG/DL
NONHDLC SERPL-MCNC: 123 MG/DL
TRIGL SERPL-MCNC: 108 MG/DL
TSH SERPL DL<=0.005 MIU/L-ACNC: 3.36 MU/L (ref 0.4–4)

## 2018-07-26 PROCEDURE — 84443 ASSAY THYROID STIM HORMONE: CPT | Performed by: FAMILY MEDICINE

## 2018-07-26 PROCEDURE — 82565 ASSAY OF CREATININE: CPT | Performed by: FAMILY MEDICINE

## 2018-07-26 PROCEDURE — 99397 PER PM REEVAL EST PAT 65+ YR: CPT | Performed by: FAMILY MEDICINE

## 2018-07-26 PROCEDURE — 82947 ASSAY GLUCOSE BLOOD QUANT: CPT | Performed by: FAMILY MEDICINE

## 2018-07-26 PROCEDURE — 85018 HEMOGLOBIN: CPT | Performed by: FAMILY MEDICINE

## 2018-07-26 PROCEDURE — 36415 COLL VENOUS BLD VENIPUNCTURE: CPT | Performed by: FAMILY MEDICINE

## 2018-07-26 PROCEDURE — 80061 LIPID PANEL: CPT | Performed by: FAMILY MEDICINE

## 2018-07-26 ASSESSMENT — ACTIVITIES OF DAILY LIVING (ADL): CURRENT_FUNCTION: NO ASSISTANCE NEEDED

## 2018-07-26 ASSESSMENT — ANXIETY QUESTIONNAIRES
IF YOU CHECKED OFF ANY PROBLEMS ON THIS QUESTIONNAIRE, HOW DIFFICULT HAVE THESE PROBLEMS MADE IT FOR YOU TO DO YOUR WORK, TAKE CARE OF THINGS AT HOME, OR GET ALONG WITH OTHER PEOPLE: NOT DIFFICULT AT ALL
3. WORRYING TOO MUCH ABOUT DIFFERENT THINGS: NOT AT ALL
1. FEELING NERVOUS, ANXIOUS, OR ON EDGE: NOT AT ALL
7. FEELING AFRAID AS IF SOMETHING AWFUL MIGHT HAPPEN: NOT AT ALL
2. NOT BEING ABLE TO STOP OR CONTROL WORRYING: NOT AT ALL
GAD7 TOTAL SCORE: 0
6. BECOMING EASILY ANNOYED OR IRRITABLE: NOT AT ALL
5. BEING SO RESTLESS THAT IT IS HARD TO SIT STILL: NOT AT ALL

## 2018-07-26 ASSESSMENT — PATIENT HEALTH QUESTIONNAIRE - PHQ9: 5. POOR APPETITE OR OVEREATING: NOT AT ALL

## 2018-07-26 ASSESSMENT — PAIN SCALES - GENERAL: PAINLEVEL: MODERATE PAIN (5)

## 2018-07-26 NOTE — NURSING NOTE
Composite Cancer Quality Initiative  Colon Cancer Initiative satisfied?  NO - Patient given verbal reminder     Updated by: John Bonilla CMA

## 2018-07-26 NOTE — PATIENT INSTRUCTIONS
At [facility], we strive to deliver an exceptional experience to you, every time we see you.  If you receive a survey in the mail, please send us back your thoughts. We really do value your feedback.    Based on your medical history, these are the current health maintenance/preventive care services that you are due for (some may have been done at this visit.)  Health Maintenance Due   Topic Date Due     EYE EXAM Q1 YEAR  01/26/2017     DEPRESSION ACTION PLAN Q1 YR  03/03/2017     PHQ-9 Q6 MONTHS  12/19/2017     COLONOSCOPY Q5 YR  02/15/2018     FALL RISK ASSESSMENT  06/19/2018         Suggested websites for health information:  Www.[employer].org : Up to date and easily searchable information on multiple topics.  Www.medlineplus.gov : medication info, interactive tutorials, watch real surgeries online  Www.familydoctor.org : good info from the Academy of Family Physicians  Www.cdc.gov : public health info, travel advisories, epidemics (H1N1)  Www.aap.org : children's health info, normal development, vaccinations  Www.health.[facility].[place].us : [place] dept of health, public health issues in [place], N1N1    Your care team:                            Family Medicine Internal Medicine   MD Dmitry Wing MD Shantel Branch-Fleming, MD Katya Georgiev PA-C Nam Ho, MD Pediatrics   YI Cullen, IVETTE Lester APRN CNP   MD Sarai Sexton MD Deborah Mielke, MD Kim Thein, APRN [facility]      Clinic hours: Monday - Thursday 7 am-7 pm; Fridays 7 am-5 pm.   Urgent care: Monday - Friday 11 am-9 pm; Saturday and Sunday 9 am-5 pm.  Pharmacy : Monday -Thursday 8 am-8 pm; Friday 8 am-6 pm; Saturday and Sunday 9 am-5 pm.     Clinic: [phone]   Pharmacy: [phone]      Preventive Health Recommendations    Female Ages 65 +    Yearly exam:     See your health care provider every year in order to  o Review health changes.   o Discuss preventive care.     o Review your medicines if your doctor has prescribed any.      You no longer need a yearly Pap test unless you've had an abnormal Pap test in the past 10 years. If you have vaginal symptoms, such as bleeding or discharge, be sure to talk with your provider about a Pap test.      Every 1 to 2 years, have a mammogram.  If you are over 69, talk with your health care provider about whether or not you want to continue having screening mammograms.      Every 10 years, have a colonoscopy. Or, have a yearly FIT test (stool test). These exams will check for colon cancer.       Have a cholesterol test every 5 years, or more often if your doctor advises it.       Have a diabetes test (fasting glucose) every three years. If you are at risk for diabetes, you should have this test more often.       At age 65, have a bone density scan (DEXA) to check for osteoporosis (brittle bone disease).    Shots:    Get a flu shot each year.    Get a tetanus shot every 10 years.    Talk to your doctor about your pneumonia vaccines. There are now two you should receive - Pneumovax (PPSV 23) and Prevnar (PCV 13).    Talk to your pharmacist about the shingles vaccine.    Talk to your doctor about the hepatitis B vaccine.    Nutrition:     Eat at least 5 servings of fruits and vegetables each day.      Eat whole-grain bread, whole-wheat pasta and brown rice instead of white grains and rice.      Get adequate Calcium and Vitamin D.     Lifestyle    Exercise at least 150 minutes a week (30 minutes a day, 5 days a week). This will help you control your weight and prevent disease.      Limit alcohol to one drink per day.      No smoking.       Wear sunscreen to prevent skin cancer.       See your dentist twice a year for an exam and cleaning.      See your eye doctor every 1 to 2 years to screen for conditions such as glaucoma, macular degeneration and cataracts.

## 2018-07-26 NOTE — PROGRESS NOTES
SUBJECTIVE:   Whitley Wesley is a 73 year old female who presents for Preventive Visit.  Are you in the first 12 months of your Medicare coverage?  No    Physical   Annual:     Getting at least 3 servings of Calcium per day:  Yes (takes calcium)    Bi-annual eye exam:  NO    Dental care twice a year:  NO    Sleep apnea or symptoms of sleep apnea:  None    Diet:  Regular (no restrictions)    Frequency of exercise:  None    Taking medications regularly:  Yes    Medication side effects:  None    Additional concerns today:  YES (1. sore arms/leg, headache occipital to back of neck, eye trouble when reading)    Ability to successfully perform activities of daily living: no assistance needed    Home Safety:  No safety concerns identified    Hearing Impairment: no hearing concerns        Fall risk:  Fallen 2 or more times in the past year?: No  Any fall with injury in the past year?: No    COGNITIVE SCREEN  1) Repeat 3 items (Leader, Season, Table)    2) Clock draw: ABNORMAL did not understand instructions  3) 3 item recall: Recalls NO objects   Results: 0 items recalled: PROBABLE COGNITIVE IMPAIRMENT, **INFORM PROVIDER**    Mini-CogTM Copyright MICHAEL Houston. Licensed by the author for use in [facility]; reprinted with permission (tl@[facility]). All rights reserved.        Reviewed and updated as needed this visit by clinical staff  Tobacco  Allergies  Meds  Problems  Med Hx  Surg Hx  Fam Hx  Soc Hx          Reviewed and updated as needed this visit by Provider  Problems        Social History   Substance Use Topics     Smoking status: Never Smoker     Smokeless tobacco: Never Used     Alcohol use No       No flowsheet data found.No flowsheet data found.        Hyperlipidemia Follow-Up      Rate your low fat/cholesterol diet?: good    Taking statin?  No    Other lipid medications/supplements?:  none    Anxiety Follow-Up    Status since last visit: Improved taking Xanax once a day seems to help a lot.  Caregiver stress with her 95 year old .     Other associated symptoms:None    Complicating factors:   Significant life event: No   Current substance abuse: None  Depression symptoms: No  STEFF-7 SCORE 12/27/2016 6/19/2017 7/26/2018   Total Score - - -   Total Score 0 0 0       STEFF-7    Today's PHQ-2 Score:   PHQ-2 ( 1999 Pfizer) 7/26/2018   Q1: Little interest or pleasure in doing things 0   Q2: Feeling down, depressed or hopeless 1   PHQ-2 Score 1       Do you feel safe in your environment - Yes    Do you have a Health Care Directive?: No: Advance care planning was reviewed with patient; patient declined at this time.    Current providers sharing in care for this patient include:   Patient Care Team:  Mary Rolle MD as PCP - General (Family Practice)    The following health maintenance items are reviewed in Epic and correct as of today:  Health Maintenance   Topic Date Due     EYE EXAM Q1 YEAR  01/26/2017     DEPRESSION ACTION PLAN Q1 YR  03/03/2017     PHQ-9 Q6 MONTHS  12/19/2017     COLONOSCOPY Q5 YR  02/15/2018     FALL RISK ASSESSMENT  06/19/2018     INFLUENZA VACCINE (1) 09/01/2018     ADVANCE DIRECTIVE PLANNING Q5 YRS  03/28/2019     MAMMO SCREEN Q2 YR (SYSTEM ASSIGNED)  06/26/2020     LIPID SCREEN Q5 YR FEMALE (SYSTEM ASSIGNED)  06/19/2022     TETANUS IMMUNIZATION (SYSTEM ASSIGNED)  02/04/2023     DEXA SCAN SCREENING (SYSTEM ASSIGNED)  Completed     PNEUMOCOCCAL  Completed     Labs reviewed in EPIC  BP Readings from Last 3 Encounters:   07/26/18 121/77   12/21/17 132/73   06/19/17 117/77    Wt Readings from Last 3 Encounters:   07/26/18 130 lb (59 kg)   12/21/17 134 lb 3.2 oz (60.9 kg)   06/19/17 134 lb (60.8 kg)                  Patient Active Problem List   Diagnosis     CARDIOVASCULAR SCREENING; LDL GOAL LESS THAN 130     Mild major depression (H)     Advanced directives, counseling/discussion     Colon polyps     Cataracts, both eyes     Generalized anxiety disorder     Caregiver stress      "Cervicalgia     Cervical spondylosis with myelopathy     DDD (degenerative disc disease), cervical     Hyperlipidemia LDL goal <160     Past Surgical History:   Procedure Laterality Date     ANKLE SURGERY Left 2007    Left ankle       Social History   Substance Use Topics     Smoking status: Never Smoker     Smokeless tobacco: Never Used     Alcohol use No     Family History   Problem Relation Age of Onset     Cancer Mother      \"urinary\"     Cancer Brother      lung     Diabetes No family hx of      Hypertension No family hx of      Cerebrovascular Disease No family hx of      Thyroid Disease No family hx of      Glaucoma No family hx of      Macular Degeneration No family hx of          Current Outpatient Prescriptions   Medication Sig Dispense Refill     acetaminophen (TYLENOL) 500 MG tablet Take 500-1,000 mg by mouth as needed for mild pain       ALPRAZolam (XANAX) 0.25 MG tablet TAKE 1 TABLET BY MOUTH 3 TIMES DAILY AS NEEDED FOR ANXIETY 90 tablet 1     CALCIUM PO Take 1,200 mg by mouth        Cholecalciferol (VITAMIN D3) 3000 UNITS TABS Take 2,000 Units by mouth        Allergies   Allergen Reactions     No Known Drug Allergies      Recent Labs   Lab Test  06/19/17   0906  12/27/16   1635  01/12/16   1546   03/04/14   1019  01/07/13   0845 08/08/12  03/10/12   LDL  112*   --    --    --    --   111   --    --   101   HDL  92   --    --    --    --   83   --    --   96   TRIG  78   --    --    --    --   73  107   --   52   ALT  31   --    --    --   35   --   17   --    --    CR  0.88  0.72  0.86   < >  0.67  0.80  0.79   < >   --    GFRESTIMATED  63  80  65   < >  87  71  77   < >   --    GFRESTBLACK  76  >90   GFR Calc    79   < >  >90  86  89   < >   --    POTASSIUM  4.3  3.9  3.8   < >  3.8  4.0  4   < >   --    TSH  4.11*   --   3.39   --   4.85  4.31  3.69   --   3.80    < > = values in this interval not displayed.        Pneumonia Vaccine:Adults age 65+ who received Pneumovax (PPSV23) at " "65 years or older: Should be given PCV13 > 1 year after their most recent PPSV23  Mammogram Screening: Patient over age 50, mutual decision to screen reflected in health maintenance.    Review of Systems  Constitutional, HEENT, cardiovascular, pulmonary, gi and gu systems are negative, except as otherwise noted.    OBJECTIVE:   /77 (BP Location: Right arm, Patient Position: Chair, Cuff Size: Adult Regular)  Pulse 69  Temp 98.7  F (37.1  C) (Oral)  Resp 20  Ht 5' 0.25\" (1.53 m)  Wt 130 lb (59 kg)  SpO2 96%  Breastfeeding? No  BMI 25.18 kg/m2 Estimated body mass index is 25.18 kg/(m^2) as calculated from the following:    Height as of this encounter: 5' 0.25\" (1.53 m).    Weight as of this encounter: 130 lb (59 kg).  Physical Exam  GENERAL APPEARANCE: healthy, alert and no distress  EYES: Eyes grossly normal to inspection, PERRL and conjunctivae and sclerae normal  HENT: ear canals and TM's normal, nose and mouth without ulcers or lesions, oropharynx clear and oral mucous membranes moist  NECK: no adenopathy, no asymmetry, masses, or scars and thyroid normal to palpation  RESP: lungs clear to auscultation - no rales, rhonchi or wheezes  CV: regular rates and rhythm, normal S1 S2, no S3 or S4, no murmur, click or rub, no peripheral edema and peripheral pulses strong  ABDOMEN: soft, nontender, no hepatosplenomegaly, no masses and bowel sounds normal  MS: no musculoskeletal defects are noted and gait is age appropriate without ataxia  SKIN: no suspicious lesions or rashes  NEURO: Normal strength and tone, sensory exam grossly normal, mentation intact and speech normal  PSYCH: mentation appears normal and affect normal/bright     Diagnostic Test Results:  Results for orders placed or performed in visit on 07/26/18 (from the past 24 hour(s))   Hemoglobin   Result Value Ref Range    Hemoglobin 13.8 11.7 - 15.7 g/dL       ASSESSMENT / PLAN:       ICD-10-CM    1. Normal physical exam Z00.00 Doing well except for " "chronic neck pain. Has been evaluated and treated for this in the past. Does not want further treatment at this time.    2. Screen for colon cancer Z12.11 Fecal colorectal cancer screen FIT - Future (S+30)   3. Screening for diabetic retinopathy Z13.5 OPTOMETRY REFERRAL   4. Mild major depression (H) F32.0 Resolved    5. Hyperlipidemia LDL goal <160 E78.5 Lipid panel reflex to direct LDL Non-fasting   6. Cervical spondylosis with myelopathy M47.12 Discussed better posture for neck and back. Corrected posture in clinic today.    7. Generalized anxiety disorder F41.1 Continue Xanax 0.25 mg as needed.    8. Elevated TSH R94.6 TSH with free T4 reflex   9. Chronic fatigue R53.82 Creatinine     Hemoglobin   10. Screening for diabetes mellitus Z13.1 Glucose       End of Life Planning:  Patient currently has an advanced directive: Yes.  Practitioner is supportive of decision.    COUNSELING:  Reviewed preventive health counseling, as reflected in patient instructions       Regular exercise       Healthy diet/nutrition       Osteoporosis Prevention/Bone Health    BP Readings from Last 1 Encounters:   07/26/18 121/77     Estimated body mass index is 25.18 kg/(m^2) as calculated from the following:    Height as of this encounter: 5' 0.25\" (1.53 m).    Weight as of this encounter: 130 lb (59 kg).           reports that she has never smoked. She has never used smokeless tobacco.      Appropriate preventive services were discussed with this patient, including applicable screening as appropriate for cardiovascular disease, diabetes, osteopenia/osteoporosis, and glaucoma.  As appropriate for age/gender, discussed screening for colorectal cancer, prostate cancer, breast cancer, and cervical cancer. Checklist reviewing preventive services available has been given to the patient.    Reviewed patients plan of care and provided an AVS. The Basic Care Plan (routine screening as documented in Health Maintenance) for Whitley meets the Care " Plan requirement. This Care Plan has been established and reviewed with the Patient.    Counseling Resources:  ATP IV Guidelines  Pooled Cohorts Equation Calculator  Breast Cancer Risk Calculator  FRAX Risk Assessment  ICSI Preventive Guidelines  Dietary Guidelines for Americans, 2010  USDA's MyPlate  ASA Prophylaxis  Lung CA Screening    Mary Rolle MD  [facility]

## 2018-07-26 NOTE — LETTER
July 30, 2018      Whitley Wesley  [address]   [place] [address]      Dear Whitley Wesley,     Your test results are attached. I am happy to let you know that they are stable and your medications can stay the same.     The blood sugar is normal and you do not have diabetes. The thyroid test is normal. The kidneys are healthy. The hemoglobin is normal and you do not have anemia.     Please call me if you have any questions about these test results or about your care.     Sincerely,     Mary Rolle MD      Resulted Orders   Lipid panel reflex to direct LDL Non-fasting   Result Value Ref Range    Cholesterol 192 <200 mg/dL    Triglycerides 108 <150 mg/dL      Comment:      Fasting specimen    HDL Cholesterol 69 >49 mg/dL    LDL Cholesterol Calculated 101 (H) <100 mg/dL      Comment:      Above desirable:  100-129 mg/dl  Borderline High:  130-159 mg/dL  High:             160-189 mg/dL  Very high:       >189 mg/dl      Non HDL Cholesterol 123 <130 mg/dL   TSH with free T4 reflex   Result Value Ref Range    TSH 3.36 0.40 - 4.00 mU/L   Glucose   Result Value Ref Range    Glucose 82 70 - 99 mg/dL      Comment:      Fasting specimen   Creatinine   Result Value Ref Range    Creatinine 0.72 0.52 - 1.04 mg/dL    GFR Estimate 79 >60 mL/min/1.7m2      Comment:      Non  GFR Calc    GFR Estimate If Black >90 >60 mL/min/1.7m2      Comment:      African American GFR Calc   Hemoglobin   Result Value Ref Range    Hemoglobin 13.8 11.7 - 15.7 g/dL

## 2018-07-26 NOTE — LETTER
My Depression Action Plan  Name: Whitley Wesley   Date of Birth 1945  Date: 7/26/2018    My doctor: Mary Rolle   My clinic: [address] [id number] [phone]          GREEN    ZONE   Good Control    What it looks like:     Things are going generally well. You have normal up s and down s. You may even feel depressed from time to time, but bad moods usually last less than a day.   What you need to do:  1. Continue to care for yourself (see self care plan)  2. Check your depression survival kit and update it as needed  3. Follow your physician s recommendations including any medication.  4. Do not stop taking medication unless you consult with your physician first.           YELLOW         ZONE Getting Worse    What it looks like:     Depression is starting to interfere with your life.     It may be hard to get out of bed; you may be starting to isolate yourself from others.    Symptoms of depression are starting to last most all day and this has happened for several days.     You may have suicidal thoughts but they are not constant.   What you need to do:     1. Call your care team, your response to treatment will improve if you keep your care team informed of your progress. Yellow periods are signs an adjustment may need to be made.     2. Continue your self-care, even if you have to fake it!    3. Talk to someone in your support network    4. Open up your depression survival kit           RED    ZONE Medical Alert - Get Help    What it looks like:     Depression is seriously interfering with your life.     You may experience these or other symptoms: You can t get out of bed most days, can t work or engage in other necessary activities, you have trouble taking care of basic hygiene, or basic responsibilities, thoughts of suicide or death that will not go away, self-injurious behavior.     What you need to do:  1. Call your care  team and request a same-day appointment. If they are not available (weekends or after hours) call your local crisis line, emergency room or 911.            Depression Self Care Plan / Survival Kit    Self-Care for Depression  Here s the deal. Your body and mind are really not as separate as most people think.  What you do and think affects how you feel and how you feel influences what you do and think. This means if you do things that people who feel good do, it will help you feel better.  Sometimes this is all it takes.  There is also a place for medication and therapy depending on how severe your depression is, so be sure to consult with your medical provider and/ or Behavioral Health Consultant if your symptoms are worsening or not improving.     In order to better manage my stress, I will:    Exercise  Get some form of exercise, every day. This will help reduce pain and release endorphins, the  feel good  chemicals in your brain. This is almost as good as taking antidepressants!  This is not the same as joining a gym and then never going! (they count on that by the way ) It can be as simple as just going for a walk or doing some gardening, anything that will get you moving.      Hygiene   Maintain good hygiene (Get out of bed in the morning, Make your bed, Brush your teeth, Take a shower, and Get dressed like you were going to work, even if you are unemployed).  If your clothes don't fit try to get ones that do.    Diet  I will strive to eat foods that are good for me, drink plenty of water, and avoid excessive sugar, caffeine, alcohol, and other mood-altering substances.  Some foods that are helpful in depression are: complex carbohydrates, B vitamins, flaxseed, fish or fish oil, fresh fruits and vegetables.    Psychotherapy  I agree to participate in Individual Therapy (if recommended).    Medication  If prescribed medications, I agree to take them.  Missing doses can result in serious side effects.  I  understand that drinking alcohol, or other illicit drug use, may cause potential side effects.  I will not stop my medication abruptly without first discussing it with my provider.    Staying Connected With Others  I will stay in touch with my friends, family members, and my primary care provider/team.    Use your imagination  Be creative.  We all have a creative side; it doesn t matter if it s oil painting, sand castles, or mud pies! This will also kick up the endorphins.    Witness Beauty  (AKA stop and smell the roses) Take a look outside, even in mid-winter. Notice colors, textures. Watch the squirrels and birds.     Service to others  Be of service to others.  There is always someone else in need.  By helping others we can  get out of ourselves  and remember the really important things.  This also provides opportunities for practicing all the other parts of the program.    Humor  Laugh and be silly!  Adjust your TV habits for less news and crime-drama and more comedy.    Control your stress  Try breathing deep, massage therapy, biofeedback, and meditation. Find time to relax each day.     My support system    Clinic Contact:  Phone number:    Contact 1:  Phone number:    Contact 2:  Phone number:    Jew/:  Phone number:    Therapist:  Phone number:    Local crisis center:    Phone number:    Other community support:  Phone number:

## 2018-07-26 NOTE — MR AVS SNAPSHOT
After Visit Summary   7/26/2018    Whitley Wesley    MRN: [id number]           Patient Information     Date Of Birth          1945        Visit Information        Provider Department      7/26/2018 10:40 AM Mary Rolle MD [facility]        Today's Diagnoses     Normal physical exam    -  1    Screen for colon cancer        Screening for diabetic retinopathy        Mild major depression (H)        Hyperlipidemia LDL goal <160        Cervical spondylosis with myelopathy        Generalized anxiety disorder        Elevated TSH        Chronic fatigue        Screening for diabetes mellitus          Care Instructions    At [facility], we strive to deliver an exceptional experience to you, every time we see you.  If you receive a survey in the mail, please send us back your thoughts. We really do value your feedback.    Based on your medical history, these are the current health maintenance/preventive care services that you are due for (some may have been done at this visit.)  Health Maintenance Due   Topic Date Due     EYE EXAM Q1 YEAR  01/26/2017     DEPRESSION ACTION PLAN Q1 YR  03/03/2017     PHQ-9 Q6 MONTHS  12/19/2017     COLONOSCOPY Q5 YR  02/15/2018     FALL RISK ASSESSMENT  06/19/2018         Suggested websites for health information:  Www.[employer] : Up to date and easily searchable information on multiple topics.  Www.medlineplus.gov : medication info, interactive tutorials, watch real surgeries online  Www.familydoctor.org : good info from the Academy of Family Physicians  Www.cdc.gov : public health info, travel advisories, epidemics (H1N1)  Www.aap.org : children's health info, normal development, vaccinations  Www.health.state.[place].us : [place] dept of health, public health issues in [place], N1N1    Your care team:                            Family Medicine Internal Medicine   MD Dmitry Wing MD Shantel Branch-Fleming, MD Katya  Jake Glover MD Pediatrics   YI Cullen, IVETTE Lester APRMD Sarai Nuñez CNP, MD Deborah Mielke, MD Kim Thein, APRN [facility]      Clinic hours: Monday - Thursday 7 am-7 pm; Fridays 7 am-5 pm.   Urgent care: Monday - Friday 11 am-9 pm; Saturday and Sunday 9 am-5 pm.  Pharmacy : Monday -Thursday 8 am-8 pm; Friday 8 am-6 pm; Saturday and Sunday 9 am-5 pm.     Clinic: [phone]   Pharmacy: [phone]      Preventive Health Recommendations    Female Ages 65 +    Yearly exam:     See your health care provider every year in order to  o Review health changes.   o Discuss preventive care.    o Review your medicines if your doctor has prescribed any.      You no longer need a yearly Pap test unless you've had an abnormal Pap test in the past 10 years. If you have vaginal symptoms, such as bleeding or discharge, be sure to talk with your provider about a Pap test.      Every 1 to 2 years, have a mammogram.  If you are over 69, talk with your health care provider about whether or not you want to continue having screening mammograms.      Every 10 years, have a colonoscopy. Or, have a yearly FIT test (stool test). These exams will check for colon cancer.       Have a cholesterol test every 5 years, or more often if your doctor advises it.       Have a diabetes test (fasting glucose) every three years. If you are at risk for diabetes, you should have this test more often.       At age 65, have a bone density scan (DEXA) to check for osteoporosis (brittle bone disease).    Shots:    Get a flu shot each year.    Get a tetanus shot every 10 years.    Talk to your doctor about your pneumonia vaccines. There are now two you should receive - Pneumovax (PPSV 23) and Prevnar (PCV 13).    Talk to your pharmacist about the shingles vaccine.    Talk to your doctor about the hepatitis B vaccine.    Nutrition:     Eat at least 5 servings of fruits and vegetables each  day.      Eat whole-grain bread, whole-wheat pasta and brown rice instead of white grains and rice.      Get adequate Calcium and Vitamin D.     Lifestyle    Exercise at least 150 minutes a week (30 minutes a day, 5 days a week). This will help you control your weight and prevent disease.      Limit alcohol to one drink per day.      No smoking.       Wear sunscreen to prevent skin cancer.       See your dentist twice a year for an exam and cleaning.      See your eye doctor every 1 to 2 years to screen for conditions such as glaucoma, macular degeneration and cataracts.          Follow-ups after your visit        Additional Services     OPTOMETRY REFERRAL       Your provider has referred you to:  [facility] [phone]    [email or web address]    Please be aware that coverage of these services is subject to the terms and limitations of your health insurance plan.  Call member services at your health plan with any benefit or coverage questions.      Please bring the following to your appointment:  >>   Any x-rays, CTs or MRIs which have been performed.  Contact the facility where they were done to arrange for  prior to your scheduled appointment.  Any new CT, MRI or other procedures ordered by your specialist must be performed at a [place] facility or coordinated by your clinic's referral office.    >>   List of current medications   >>   This referral request   >>   Any documents/labs given to you for this referral                  Future tests that were ordered for you today     Open Future Orders        Priority Expected Expires Ordered    Fecal colorectal cancer screen FIT - Future (S+30) Routine 8/16/2018 8/25/2018 7/26/2018            Who to contact     If you have questions or need follow up information about today's clinic visit or your schedule please contact [facility] directly at [phone].  Normal or  "non-critical lab and imaging results will be communicated to you by MyChart, letter or phone within 4 business days after the clinic has received the results. If you do not hear from us within 7 days, please contact the clinic through LiveMinutest or phone. If you have a critical or abnormal lab result, we will notify you by phone as soon as possible.  Submit refill requests through Hooja or call your pharmacy and they will forward the refill request to us. Please allow 3 business days for your refill to be completed.          Additional Information About Your Visit        Hooja Information     Hooja lets you send messages to your doctor, view your test results, renew your prescriptions, schedule appointments and more. To sign up, go to [email or web address] . Click on \"Log in\" on the left side of the screen, which will take you to the Welcome page. Then click on \"Sign up Now\" on the right side of the page.     You will be asked to enter the access code listed below, as well as some personal information. Please follow the directions to create your username and password.     Your access code is: [email or web address]  Expires: 10/24/2018 11:18 AM     Your access code will  in 90 days. If you need help or a new code, please call your [place] clinic or [phone].        Care EveryWhere ID     This is your Care EveryWhere ID. This could be used by other organizations to access your [place] medical records  [id number]        Your Vitals Were     Pulse Temperature Respirations Height Pulse Oximetry Breastfeeding?    69 98.7  F (37.1  C) (Oral) 20 5' 0.25\" (1.53 m) 96% No    BMI (Body Mass Index)                   25.18 kg/m2            Blood Pressure from Last 3 Encounters:   18 121/77   17 132/73   17 117/77    Weight from Last 3 Encounters:   18 130 lb (59 kg)   17 134 lb 3.2 oz (60.9 kg)   17 134 lb (60.8 kg)              We Performed the Following     Creatinine     Glucose  "    Hemoglobin     Lipid panel reflex to direct LDL Non-fasting     OPTOMETRY REFERRAL     TSH with free T4 reflex          Today's Medication Changes          These changes are accurate as of 7/26/18 11:18 AM.  If you have any questions, ask your nurse or doctor.               Stop taking these medicines if you haven't already. Please contact your care team if you have questions.     MULTI-VITAMIN DAILY PO   Stopped by:  Mary Rolle MD                    Primary Care Provider Office Phone # Fax #    Mary Rolle [phone]       [address]  [facility] [address]        Equal Access to Services     [facility]: Hadii aad ku hadasho Soomaali, waaxda luqadaha, qaybta kaalmada adeegyada, waxay idiin hayaan adeeg kharayaima ram . So [facility] [phone].    ATENCIÓN: Si habla español, tiene a lentz disposición servicios gratuitos de asistencia lingüística. Llame al [phone].    We comply with applicable federal civil rights laws and [place] laws. We do not discriminate on the basis of race, color, national origin, age, disability, sex, sexual orientation, or gender identity.            Thank you!     Thank you for choosing [facility]  for your care. Our goal is always to provide you with excellent care. Hearing back from our patients is one way we can continue to improve our services. Please take a few minutes to complete the written survey that you may receive in the mail after your visit with us. Thank you!             Your Updated Medication List - Protect others around you: Learn how to safely use, store and throw away your medicines at www.disposemymeds.org.          This list is accurate as of 7/26/18 11:18 AM.  Always use your most recent med list.                   Brand Name Dispense Instructions for use Diagnosis    acetaminophen 500 MG tablet    TYLENOL     Take 500-1,000 mg by mouth as needed for mild pain        ALPRAZolam 0.25 MG tablet     XANAX    90 tablet    TAKE 1 TABLET BY MOUTH 3 TIMES DAILY AS NEEDED FOR ANXIETY    Generalized anxiety disorder       CALCIUM PO      Take 1,200 mg by mouth        Vitamin D3 3000 units Tabs      Take 2,000 Units by mouth

## 2018-07-27 PROCEDURE — 82274 ASSAY TEST FOR BLOOD FECAL: CPT | Performed by: FAMILY MEDICINE

## 2018-07-27 ASSESSMENT — PATIENT HEALTH QUESTIONNAIRE - PHQ9: SUM OF ALL RESPONSES TO PHQ QUESTIONS 1-9: 1

## 2018-07-27 ASSESSMENT — ANXIETY QUESTIONNAIRES: GAD7 TOTAL SCORE: 0

## 2018-07-28 NOTE — PROGRESS NOTES
Dear Whitley Wesley,    Your test results are attached. I am happy to let you know that they are stable and your medications can stay the same.    The blood sugar is normal and you do not have diabetes. The thyroid test is normal. The kidneys are healthy. The hemoglobin is normal and you do not have anemia.     Please call me if you have any questions about these test results or about your care.    Sincerely,    Mary Rolle MD

## 2018-07-31 DIAGNOSIS — Z12.11 SCREEN FOR COLON CANCER: ICD-10-CM

## 2018-07-31 LAB — HEMOCCULT STL QL IA: NEGATIVE

## 2018-07-31 NOTE — LETTER
August 1, 2018      Whitley Wesley  [address]   [place] [address]      Dear Whitley Wesley,     The lab results from the most recent visit are all normal or in a good range.     There was no blood in the stool. Recheck in 1 year.     Please call me if you have any questions about your condition or care.     Sincerely,     Mary Rolle MD       Resulted Orders   Fecal colorectal cancer screen FIT - Future (S+30)   Result Value Ref Range    Occult Blood Scn FIT Negative NEG^Negative

## 2018-08-01 NOTE — PROGRESS NOTES
Dear Whitley Wesley,    The lab results from the most recent visit are all normal or in a good range.     There was no blood in the stool. Recheck in 1 year.    Please call me if you have any questions about your condition or care.     Sincerely,    Mary Rolle MD

## 2018-08-16 ENCOUNTER — OFFICE VISIT (OUTPATIENT)
Dept: FAMILY MEDICINE | Facility: CLINIC | Age: 73
End: 2018-08-16
Payer: COMMERCIAL

## 2018-08-16 VITALS
OXYGEN SATURATION: 96 % | HEART RATE: 79 BPM | WEIGHT: 130.6 LBS | BODY MASS INDEX: 25.64 KG/M2 | RESPIRATION RATE: 16 BRPM | DIASTOLIC BLOOD PRESSURE: 75 MMHG | SYSTOLIC BLOOD PRESSURE: 112 MMHG | HEIGHT: 60 IN | TEMPERATURE: 98.4 F

## 2018-08-16 DIAGNOSIS — Z86.39 HISTORY OF VITAMIN D DEFICIENCY: ICD-10-CM

## 2018-08-16 DIAGNOSIS — M76.31 ILIOTIBIAL BAND SYNDROME, RIGHT: Primary | ICD-10-CM

## 2018-08-16 DIAGNOSIS — R51.9 RIGHT TEMPORAL HEADACHE: ICD-10-CM

## 2018-08-16 DIAGNOSIS — S46.811A STRAIN OF TRAPEZIUS MUSCLE, RIGHT, INITIAL ENCOUNTER: ICD-10-CM

## 2018-08-16 LAB
DEPRECATED CALCIDIOL+CALCIFEROL SERPL-MC: 71 UG/L (ref 20–75)
ERYTHROCYTE [SEDIMENTATION RATE] IN BLOOD BY WESTERGREN METHOD: 9 MM/H (ref 0–30)

## 2018-08-16 PROCEDURE — 36415 COLL VENOUS BLD VENIPUNCTURE: CPT | Performed by: INTERNAL MEDICINE

## 2018-08-16 PROCEDURE — 99214 OFFICE O/P EST MOD 30 MIN: CPT | Performed by: INTERNAL MEDICINE

## 2018-08-16 PROCEDURE — 85652 RBC SED RATE AUTOMATED: CPT | Performed by: INTERNAL MEDICINE

## 2018-08-16 PROCEDURE — 82306 VITAMIN D 25 HYDROXY: CPT | Performed by: INTERNAL MEDICINE

## 2018-08-16 RX ORDER — DICLOFENAC SODIUM 25 MG/1
25 TABLET, DELAYED RELEASE ORAL 2 TIMES DAILY
Qty: 60 TABLET | Refills: 0 | Status: SHIPPED | OUTPATIENT
Start: 2018-08-16 | End: 2018-09-30

## 2018-08-16 ASSESSMENT — PAIN SCALES - GENERAL: PAINLEVEL: NO PAIN (0)

## 2018-08-16 NOTE — MR AVS SNAPSHOT
"              After Visit Summary   2018    Whitley Wesley    MRN: [id number]           Patient Information     Date Of Birth          1945        Visit Information        Provider Department      2018 9:00 AM Dmitry Segura MD [facility]        Today's Diagnoses     Iliotibial band syndrome, right    -  1    Strain of trapezius muscle, right, initial encounter        Polymyalgia rheumatica (H), suspected        Vitamin D deficiency           Follow-ups after your visit        Who to contact     If you have questions or need follow up information about today's clinic visit or your schedule please contact [facility] directly at [phone].  Normal or non-critical lab and imaging results will be communicated to you by MyChart, letter or phone within 4 business days after the clinic has received the results. If you do not hear from us within 7 days, please contact the clinic through MyChart or phone. If you have a critical or abnormal lab result, we will notify you by phone as soon as possible.  Submit refill requests through MediaPass or call your pharmacy and they will forward the refill request to us. Please allow 3 business days for your refill to be completed.          Additional Information About Your Visit        MyChart Information     MediaPass lets you send messages to your doctor, view your test results, renew your prescriptions, schedule appointments and more. To sign up, go to [email or web address] . Click on \"Log in\" on the left side of the screen, which will take you to the Welcome page. Then click on \"Sign up Now\" on the right side of the page.     You will be asked to enter the access code listed below, as well as some personal information. Please follow the directions to create your username and password.     Your access code is: [email or web address]  Expires: 10/24/2018 11:18 AM     Your access code will  in 90 days. If you need help or a new " "code, please call your [place] clinic or [phone].        Care EveryWhere ID     This is your Care EveryWhere ID. This could be used by other organizations to access your [place] medical records  [id number]        Your Vitals Were     Pulse Temperature Respirations Height Pulse Oximetry Breastfeeding?    79 98.4  F (36.9  C) (Oral) 16 5' 0.25\" (1.53 m) 96% No    BMI (Body Mass Index)                   25.29 kg/m2            Blood Pressure from Last 3 Encounters:   08/16/18 112/75   07/26/18 121/77   12/21/17 132/73    Weight from Last 3 Encounters:   08/16/18 130 lb 9.6 oz (59.2 kg)   07/26/18 130 lb (59 kg)   12/21/17 134 lb 3.2 oz (60.9 kg)              We Performed the Following     ESR: Erythrocyte sedimentation rate     Vitamin D Deficiency          Today's Medication Changes          These changes are accurate as of 8/16/18 10:09 AM.  If you have any questions, ask your nurse or doctor.               Start taking these medicines.        Dose/Directions    diclofenac 1 % Gel topical gel   Commonly known as:  VOLTAREN   Used for:  Iliotibial band syndrome, right, Strain of trapezius muscle, right, initial encounter   Started by:  Dmitry Segura MD        Apply  2 grams to hands four times daily using enclosed dosing card.   Quantity:  100 g   Refills:  5       diclofenac 25 MG EC tablet   Commonly known as:  VOLTAREN   Used for:  Strain of trapezius muscle, right, initial encounter, Iliotibial band syndrome, right   Started by:  Dmitry Segura MD        Dose:  25 mg   Take 1 tablet (25 mg) by mouth 2 times daily Take with food only.   Quantity:  60 tablet   Refills:  0            Where to get your medicines      These [place] [facility] [place] [address]  [address], [facility] [address]     Phone:  [phone]     diclofenac 1 % Gel topical gel    diclofenac 25 MG EC tablet                Primary Care Provider Office Phone " # Fax #    Mary Dena Rolle [phone]       [address]  [facility] [address]        Equal Access to Services     [facility] : Hadjuliano jyotsna oconnor deenao Sojoelali, waaxda luqadaha, qaybta kaalmada adeegyoonda, hank sorenson laGallitoofelia alarcon. So [facility] [phone].    ATENCIÓN: Si habla español, tiene a lentz disposición servicios gratuitos de asistencia lingüística. Llame al [phone].    We comply with applicable federal civil rights laws and [place] laws. We do not discriminate on the basis of race, color, national origin, age, disability, sex, sexual orientation, or gender identity.            Thank you!     Thank you for choosing [facility]  for your care. Our goal is always to provide you with excellent care. Hearing back from our patients is one way we can continue to improve our services. Please take a few minutes to complete the written survey that you may receive in the mail after your visit with us. Thank you!             Your Updated Medication List - Protect others around you: Learn how to safely use, store and throw away your medicines at www.disposemymeds.org.          This list is accurate as of 8/16/18 10:09 AM.  Always use your most recent med list.                   Brand Name Dispense Instructions for use Diagnosis    acetaminophen 500 MG tablet    TYLENOL     Take 500-1,000 mg by mouth as needed for mild pain        ALPRAZolam 0.25 MG tablet    XANAX    90 tablet    TAKE 1 TABLET BY MOUTH 3 TIMES DAILY AS NEEDED FOR ANXIETY    Generalized anxiety disorder       CALCIUM PO      Take 1,200 mg by mouth        diclofenac 1 % Gel topical gel    VOLTAREN    100 g    Apply  2 grams to hands four times daily using enclosed dosing card.    Iliotibial band syndrome, right, Strain of trapezius muscle, right, initial encounter       diclofenac 25 MG EC tablet    VOLTAREN    60 tablet    Take 1 tablet (25 mg) by mouth 2 times daily Take with food only.     Strain of trapezius muscle, right, initial encounter, Iliotibial band syndrome, right       Vitamin D3 3000 units Tabs      Take 2,000 Units by mouth

## 2018-08-16 NOTE — LETTER
August 16, 2018      Whitley Wesley  [address]   [place] [address]        Dear Whitley,                         Your sed rate level is normal. It means that you do not have polymyalgia rheumatica. Continue Diclofenac as prescribed. For any questions, you may call my office at [phone].    Sincerely,       Dmitry Segura MD/deny  Internal Medicine    Resulted Orders   ESR: Erythrocyte sedimentation rate   Result Value Ref Range    Sed Rate 9 0 - 30 mm/h

## 2018-08-16 NOTE — LETTER
August 23, 2018      Whitley Wesley  [address]   [place] [facility] [address]        Dear Whitley,                           Your Vitamin D level is normal. For any questions, you may call my office at [phone].     Sincerely,     Dmitry Segura MD   Internal Medicine

## 2018-08-16 NOTE — PROGRESS NOTES
"  SUBJECTIVE:   Whitley Wesley is a 73 year old female who presents to clinic today for the following health issues:    Joint or Musculoskeletal Pain  Duration of complaint: 2 weeks   Description:   Location: right-sided neck pain and lateral aspect of right thigh  Character: throbbing and burning.  Intensity: moderate  Progression of Symptoms: worse  Accompanying Signs & Symptoms: Other symptoms: radiation of neck pain to lateral aspect of right upper extremity  History: Previous similar pain: no     Precipitating factors: Trauma or overuse: no but suspect right cervical radiculopathy  Alleviating factors: Improved by: nothing  Therapies Tried and outcome: Tylenol (not effective)    Problem list and histories reviewed & adjusted, as indicated.  Additional history: as documented    Patient Active Problem List   Diagnosis     CARDIOVASCULAR SCREENING; LDL GOAL LESS THAN 130     Mild major depression (H)     Advanced directives, counseling/discussion     Colon polyps     Cataracts, both eyes     Generalized anxiety disorder     Caregiver stress     Cervicalgia     Cervical spondylosis with myelopathy     DDD (degenerative disc disease), cervical     Hyperlipidemia LDL goal <160     Past Surgical History:   Procedure Laterality Date     ANKLE SURGERY Left 2007    Left ankle       Social History   Substance Use Topics     Smoking status: Never Smoker     Smokeless tobacco: Never Used     Alcohol use No     Family History   Problem Relation Age of Onset     Cancer Mother      \"urinary\"     Cancer Brother      lung     Diabetes No family hx of      Hypertension No family hx of      Cerebrovascular Disease No family hx of      Thyroid Disease No family hx of      Glaucoma No family hx of      Macular Degeneration No family hx of          Allergies   Allergen Reactions     No Known Drug Allergies      Recent Labs   Lab Test  07/26/18   1123  06/19/17   0906  12/27/16   1635   03/04/14   1019  01/07/13   0845 08/08/12   LDL  " "101*  112*   --    --    --   111   --    HDL  69  92   --    --    --   83   --    TRIG  108  78   --    --    --   73  107   ALT   --   31   --    --   35   --   17   CR  0.72  0.88  0.72   < >  0.67  0.80  0.79   GFRESTIMATED  79  63  80   < >  87  71  77   GFRESTBLACK  >90  76  >90   GFR Calc     < >  >90  86  89   POTASSIUM   --   4.3  3.9   < >  3.8  4.0  4   TSH  3.36  4.11*   --    < >  4.85  4.31  3.69    < > = values in this interval not displayed.      BP Readings from Last 3 Encounters:   08/16/18 112/75   07/26/18 121/77   12/21/17 132/73    Wt Readings from Last 3 Encounters:   08/16/18 130 lb 9.6 oz (59.2 kg)   07/26/18 130 lb (59 kg)   12/21/17 134 lb 3.2 oz (60.9 kg)                 ROS:  CONSTITUTIONAL: NEGATIVE for fever, chills, change in weight  INTEGUMENTARY/SKIN: NEGATIVE for worrisome rashes, moles or lesions  EYES: NEGATIVE for vision changes or irritation  ENT/MOUTH: NEGATIVE for ear, mouth and throat problems  RESP: NEGATIVE for significant cough or SOB  CV: NEGATIVE for chest pain, palpitations or peripheral edema  GI: NEGATIVE for nausea, abdominal pain, heartburn, or change in bowel habits  : NEGATIVE for frequency, dysuria, or hematuria  MUSCULOSKELETAL:As above.  NEURO: NEGATIVE for weakness, dizziness or paresthesias  ENDOCRINE: POSITIVE  for history of Vitamin D deficiency.  HEME: NEGATIVE for bleeding problems  PSYCHIATRIC: NEGATIVE for changes in mood or affect    OBJECTIVE:     /75 (BP Location: Left arm, Patient Position: Chair, Cuff Size: Adult Regular)  Pulse 79  Temp 98.4  F (36.9  C) (Oral)  Resp 16  Ht 5' 0.25\" (1.53 m)  Wt 130 lb 9.6 oz (59.2 kg)  SpO2 96%  Breastfeeding? No  BMI 25.29 kg/m2  Body mass index is 25.29 kg/(m^2).  GENERAL: alert, no distress and fatigued  EYES: Eyes grossly normal to inspection, conjunctivae and sclerae normal and tenderness on palpation of right temple.  HENT: normal cephalic/atraumatic and oral mucous " membranes moist  RESP: lungs clear to auscultation - no rales, rhonchi or wheezes  CV: regular rate and rhythm, normal S1 S2, no S3 or S4, no murmur, click or rub, no peripheral edema and peripheral pulses strong  MS: Tenderness on palpation of right iliotibial band and right trapezius muscle.  SKIN: no suspicious lesions or rashes  NEURO: Normal strength and tone, mentation intact and speech normal  PSYCH: mentation appears normal, affect normal/bright    Diagnostic Test Results:  Results for orders placed or performed in visit on 08/16/18   ESR: Erythrocyte sedimentation rate   Result Value Ref Range    Sed Rate 9 0 - 30 mm/h   Vitamin D Deficiency   Result Value Ref Range    Vitamin D Deficiency screening 71 20 - 75 ug/L       ASSESSMENT/PLAN:     (M76.31) Iliotibial band syndrome, right  (primary encounter diagnosis)  Comment: Most probable cause of right sided thigh pain.  Plan: diclofenac (VOLTAREN) 25 MG EC tablet,         diclofenac (VOLTAREN) 1 % GEL topical gel            (S46.811A) Strain of trapezius muscle, right, initial encounter  Comment: Most likely cause of right-sided neck pain.  Plan: diclofenac (VOLTAREN) 25 MG EC tablet,         diclofenac (VOLTAREN) 1 % GEL topical gel            (R51) Right temporal headache  Comment: Polymyalgia rheumatica ruled out.  Plan: ESR: Erythrocyte sedimentation rate            (Z86.39) History of vitamin D deficiency  Comment: Normal Vit. D level.  Plan: Vitamin D Deficiency            Follow-up visit if condition worsens.    Dmitry Segura MD  [facility]

## 2018-09-28 ENCOUNTER — OFFICE VISIT (OUTPATIENT)
Dept: FAMILY MEDICINE | Facility: CLINIC | Age: 73
End: 2018-09-28
Payer: COMMERCIAL

## 2018-09-28 VITALS
TEMPERATURE: 97.7 F | RESPIRATION RATE: 20 BRPM | HEART RATE: 86 BPM | OXYGEN SATURATION: 97 % | SYSTOLIC BLOOD PRESSURE: 129 MMHG | WEIGHT: 132 LBS | DIASTOLIC BLOOD PRESSURE: 74 MMHG | BODY MASS INDEX: 25.57 KG/M2

## 2018-09-28 DIAGNOSIS — F32.0 MILD MAJOR DEPRESSION (H): ICD-10-CM

## 2018-09-28 DIAGNOSIS — M54.2 CERVICALGIA: Primary | ICD-10-CM

## 2018-09-28 DIAGNOSIS — M47.12 CERVICAL SPONDYLOSIS WITH MYELOPATHY: ICD-10-CM

## 2018-09-28 DIAGNOSIS — Z63.6 CAREGIVER STRESS: ICD-10-CM

## 2018-09-28 DIAGNOSIS — E55.9 VITAMIN D DEFICIENCY: ICD-10-CM

## 2018-09-28 DIAGNOSIS — F41.1 GENERALIZED ANXIETY DISORDER: ICD-10-CM

## 2018-09-28 PROCEDURE — 99214 OFFICE O/P EST MOD 30 MIN: CPT | Performed by: FAMILY MEDICINE

## 2018-09-28 RX ORDER — TIZANIDINE 2 MG/1
2-4 TABLET ORAL 3 TIMES DAILY PRN
Qty: 60 TABLET | Refills: 3 | Status: SHIPPED | OUTPATIENT
Start: 2018-09-28 | End: 2018-12-27

## 2018-09-28 RX ORDER — INFLUENZA A VIRUS A/VICTORIA/4897/2022 IVR-238 (H1N1) ANTIGEN (FORMALDEHYDE INACTIVATED), INFLUENZA A VIRUS A/CALIFORNIA/122/2022 SAN-022 (H3N2) ANTIGEN (FORMALDEHYDE INACTIVATED), AND INFLUENZA B VIRUS B/MICHIGAN/01/2021 ANTIGEN (FORMALDEHYDE INACTIVATED) 60; 60; 60 UG/.5ML; UG/.5ML; UG/.5ML
INJECTION, SUSPENSION INTRAMUSCULAR
Refills: 0 | COMMUNITY
Start: 2018-09-22 | End: 2018-12-27

## 2018-09-28 SDOH — SOCIAL STABILITY - SOCIAL INSECURITY: DEPENDENT RELATIVE NEEDING CARE AT HOME: Z63.6

## 2018-09-28 ASSESSMENT — PAIN SCALES - GENERAL: PAINLEVEL: NO PAIN (0)

## 2018-09-28 NOTE — PROGRESS NOTES
SUBJECTIVE:   Whitley Wesley is a 73 year old female who presents to clinic today for the following health issues:    Follow up  Onset: over a month    Description:   Right sided neck pain and lateral aspect of right thigh.    Intensity: mild    Progression of Symptoms:  same    Accompanying Signs & Symptoms:  Left side of the head is still sore, feet and arms are still sore.  not able to walk anymore due to paralysis from back injury. She is having to push his wheel chair and help him with transfers. They have not been interested in home help.    Previous history of similar problem:   none    Precipitating factors:   Worsened by: staying in a fixed position     Alleviating factors:  Improved by: moving around.    Therapies Tried and outcome: no medication was taken,    Depression and Anxiety Follow-Up    Status since last visit: No change    Other associated symptoms:None    Complicating factors:     Significant life event: Yes-  's poor health.      Current substance abuse: None    PHQ-9 12/27/2016 6/19/2017 7/26/2018   Total Score 0 0 1   Q9: Suicide Ideation Not at all Not at all Not at all     STEFF-7 SCORE 12/27/2016 6/19/2017 7/26/2018   Total Score - - -   Total Score 0 0 0       PHQ-9  English  PHQ-9   Any Language  STEFF-7  Suicide Assessment Five-step Evaluation and Treatment (SAFE-T)    Problem list and histories reviewed & adjusted, as indicated.  Additional history: as documented    Patient Active Problem List   Diagnosis     CARDIOVASCULAR SCREENING; LDL GOAL LESS THAN 130     Mild major depression (H)     Advanced directives, counseling/discussion     Colon polyps     Cataracts, both eyes     Generalized anxiety disorder     Caregiver stress     Cervicalgia     Cervical spondylosis with myelopathy     DDD (degenerative disc disease), cervical     Hyperlipidemia LDL goal <160     Past Surgical History:   Procedure Laterality Date     ANKLE SURGERY Left 2007    Left ankle       Social  "History   Substance Use Topics     Smoking status: Never Smoker     Smokeless tobacco: Never Used     Alcohol use No     Family History   Problem Relation Age of Onset     Cancer Mother      \"urinary\"     Cancer Brother      lung     Diabetes No family hx of      Hypertension No family hx of      Cerebrovascular Disease No family hx of      Thyroid Disease No family hx of      Glaucoma No family hx of      Macular Degeneration No family hx of          Current Outpatient Prescriptions   Medication Sig Dispense Refill     acetaminophen (TYLENOL) 500 MG tablet Take 500-1,000 mg by mouth as needed for mild pain       tiZANidine (ZANAFLEX) 2 MG tablet Take 1-2 tablets (2-4 mg) by mouth 3 times daily as needed for muscle spasms (or neck pain) 60 tablet 3     ALPRAZolam (XANAX) 0.25 MG tablet TAKE 1 TABLET BY MOUTH 3 TIMES DAILY AS NEEDED FOR ANXIETY (Patient not taking: Reported on 9/28/2018) 90 tablet 1     CALCIUM PO Take 1,200 mg by mouth        diclofenac (VOLTAREN) 1 % GEL topical gel Apply  2 grams to hands four times daily using enclosed dosing card. (Patient not taking: Reported on 9/28/2018) 100 g 5     diclofenac (VOLTAREN) 25 MG EC tablet Take 1 tablet (25 mg) by mouth 2 times daily Take with food only. (Patient not taking: Reported on 9/28/2018) 60 tablet 0     FLUZONE HIGH-DOSE 0.5 ML injection TO BE ADMINISTERED BY PHARMACIST FOR IMMUNIZATION  0     Allergies   Allergen Reactions     No Known Drug Allergies      Recent Labs   Lab Test  07/26/18   1123  06/19/17   0906  12/27/16   1635   03/04/14   1019  01/07/13   0845 08/08/12   LDL  101*  112*   --    --    --   111   --    HDL  69  92   --    --    --   83   --    TRIG  108  78   --    --    --   73  107   ALT   --   31   --    --   35   --   17   CR  0.72  0.88  0.72   < >  0.67  0.80  0.79   GFRESTIMATED  79  63  80   < >  87  71  77   GFRESTBLACK  >90  76  >90   GFR Calc     < >  >90  86  89   POTASSIUM   --   4.3  3.9   < >  3.8  4.0  " 4   TSH  3.36  4.11*   --    < >  4.85  4.31  3.69    < > = values in this interval not displayed.      BP Readings from Last 3 Encounters:   09/28/18 129/74   08/16/18 112/75   07/26/18 121/77    Wt Readings from Last 3 Encounters:   09/28/18 132 lb (59.9 kg)   08/16/18 130 lb 9.6 oz (59.2 kg)   07/26/18 130 lb (59 kg)                  Labs reviewed in EPIC    Reviewed and updated as needed this visit by clinical staff       Reviewed and updated as needed this visit by Provider         ROS:  Constitutional, HEENT, cardiovascular, pulmonary, gi and gu systems are negative, except as otherwise noted.    OBJECTIVE:     /74 (BP Location: Left arm, Patient Position: Chair, Cuff Size: Adult Regular)  Pulse 86  Temp 97.7  F (36.5  C) (Oral)  Resp 20  Wt 132 lb (59.9 kg)  SpO2 97%  BMI 25.57 kg/m2  Body mass index is 25.57 kg/(m^2).  GENERAL APPEARANCE: healthy, alert and no distress  EYES: Eyes grossly normal to inspection, PERRL and conjunctivae and sclerae normal  HENT: ear canals and TM's normal, nose and mouth without ulcers or lesions, oropharynx clear and oral mucous membranes moist  NECK: no adenopathy, no asymmetry, masses, or scars and thyroid normal to palpation, decreased ROM with some muscle tenderness in left side and rhomboids.   RESP: lungs clear to auscultation - no rales, rhonchi or wheezes  CV: regular rates and rhythm, normal S1 S2, no S3 or S4, no murmur, click or rub, no peripheral edema and peripheral pulses strong  ABDOMEN: soft, nontender, no hepatosplenomegaly, no masses and bowel sounds normal  MS: no musculoskeletal defects are noted and gait is age appropriate without ataxia  SKIN: no suspicious lesions or rashes  NEURO: Normal strength and tone, sensory exam grossly normal, mentation intact and speech normal  PSYCH: mentation appears normal and affect normal/bright     Diagnostic Test Results:  Results for orders placed or performed in visit on 08/16/18   ESR: Erythrocyte  "sedimentation rate   Result Value Ref Range    Sed Rate 9 0 - 30 mm/h   Vitamin D Deficiency   Result Value Ref Range    Vitamin D Deficiency screening 71 20 - 75 ug/L       ASSESSMENT/PLAN:         Tobacco Cessation:   reports that she has never smoked. She has never used smokeless tobacco.      BMI:   Estimated body mass index is 25.57 kg/(m^2) as calculated from the following:    Height as of 8/16/18: 5' 0.25\" (1.53 m).    Weight as of this encounter: 132 lb (59.9 kg).           ICD-10-CM    1. Cervicalgia M54.2 tiZANidine (ZANAFLEX) 2 MG tablet take 1-2 three times a day as needed for neck pain. Discussed posture and help for taking care of her . Follow up neck x ray and physical therapy if needed. Sed rate normal   2. Vitamin D deficiency E55.9 Stable    3. Cervical spondylosis with myelopathy M47.12 No signs of worsening neuropathy   4. Generalized anxiety disorder F41.1 improved   5. Mild major depression (H) F32.0 stable   6. Caregiver stress Z63.6 Seems to be doing OK        CONSULTATION/REFERRAL to spine specialist and physical therapy if not improving.   FUTURE LABS:       - Schedule fasting labs in 6 months  FUTURE APPOINTMENTS:       - Follow-up visit in 2-3 months or sooner if any questions or concerns.   See Patient Instructions    Mary Rolle MD  [facility]  "

## 2018-09-28 NOTE — MR AVS SNAPSHOT
After Visit Summary   9/28/2018    Whitley Wesley    MRN: [id number]           Patient Information     Date Of Birth          1945        Visit Information        Provider Department      9/28/2018 2:00 PM Mary Rolle MD [facility]        Today's Diagnoses     Vitamin D deficiency    -  1    Cervical spondylosis with myelopathy        Neck pain          Care Instructions    At [facility], we strive to deliver an exceptional experience to you, every time we see you.  If you receive a survey in the mail, please send us back your thoughts. We really do value your feedback.    Your care team:                            Family Medicine Internal Medicine   MD Dmitry Wing MD Shantel Branch-Fleming, MD Katya Georgiev PA-C Megan Hill, APRDREA Glover MD Pediatrics   YI Cullen, MD Chiquis Romero APRN CNP   MD Sarai Sexton MD Deborah Mielke, MD Kim Thein, APRN CNP      Clinic hours: Monday - Thursday 7 am-7 pm; Fridays 7 am-5 pm.   Urgent care: Monday - Friday 11 am-9 pm; Saturday and Sunday 9 am-5 pm.  Pharmacy : Monday -Thursday 8 am-8 pm; Friday 8 am-6 pm; Saturday and Sunday 9 am-5 pm.     Clinic: [phone]   Pharmacy: [phone]        Understanding Restless Legs Syndrome    Are you ever annoyed by a creeping or itching feeling in your legs? Do you often feel an urge to move your legs while sitting or lying in bed? This can keep you from falling asleep at night. You may then feel tired during the day. If you have these problems, talk to your healthcare provider. He or she can suggest a treatment plan and help you find ways to sleep better.  Restless legs syndrome (RLS)  RLS is a creeping, crawly, or jumpy feeling in the legs with an urge to move them. Symptoms of RLS often occur during periods of inactivity, such as when you sit or lie down at night.  This discomfort can keep you from falling asleep. RLS is more common in older people and tends to run in families. Overuse of caffeine or alcohol may make symptoms worse. Iron deficiency, diabetes, or kidney problems can contribute to RLS.  Periodic limb movement syndrome (PLMS)  PLMS is sudden, repetitive leg jerking during sleep. The person you sleep with is often the one who notices it. Your legs may jerk many times during the night. You and your partner may both have trouble sleeping and feel tired in the morning. PLMS shouldn t be confused with the normal leg or body twitching many people have when first falling asleep.  Treating these problems  If these problems are causing disrupted sleep and daytime symptoms, treatment may be needed. Possible treatments may include:    Avoiding medicines like antidepressants, antinausea medicine, and other medicines that block dopamine    Iron supplements    Prescribed medicines including pramipexole, ropinirole, ritigotine, pregbalin, cabergoline, levodopa, and clonidine    Lifestyle changes, such as regular exercise, controlling caffeine intake, alcohol, and smoking  Date Last Reviewed: 9/1/2017 2000-2017 [employer]. [address] [place] [address]. All rights reserved. This information is not intended as a substitute for professional medical care. Always follow your healthcare professional's instructions.                Follow-ups after your visit        Follow-up notes from your care team     Return in about 3 months (around 12/28/2018) for medication follow up, recheck, Lab Work.      Who to contact     If you have questions or need follow up information about today's clinic visit or your schedule please contact [facility] directly at [phone].  Normal or non-critical lab and imaging results will be communicated to you by MyChart, letter or phone within 4 business days after the clinic has received the results. If you  do not hear from us within 7 days, please contact the clinic through [employer] or phone. If you have a critical or abnormal lab result, we will notify you by phone as soon as possible.  Submit refill requests through [employer] or call your pharmacy and they will forward the refill request to us. Please allow 3 business days for your refill to be completed.          Additional Information About Your Visit        Care EveryWhere ID     This is your Care EveryWhere ID. This could be used by other organizations to access your [place] medical records  [id number]        Your Vitals Were     Pulse Temperature Respirations Pulse Oximetry BMI (Body Mass Index)       86 97.7  F (36.5  C) (Oral) 20 97% 25.57 kg/m2        Blood Pressure from Last 3 Encounters:   09/28/18 129/74   08/16/18 112/75   07/26/18 121/77    Weight from Last 3 Encounters:   09/28/18 132 lb (59.9 kg)   08/16/18 130 lb 9.6 oz (59.2 kg)   07/26/18 130 lb (59 kg)              Today, you had the following     No orders found for display         Today's Medication Changes          These changes are accurate as of 9/28/18  2:37 PM.  If you have any questions, ask your nurse or doctor.               Start taking these medicines.        Dose/Directions    tiZANidine 2 MG tablet   Commonly known as:  ZANAFLEX   Used for:  Neck pain   Started by:  Mary Rolle MD        Dose:  2-4 mg   Take 1-2 tablets (2-4 mg) by mouth 3 times daily as needed for muscle spasms (or neck pain)   Quantity:  60 tablet   Refills:  3         Stop taking these medicines if you haven't already. Please contact your care team if you have questions.     Vitamin D3 3000 units Tabs   Stopped by:  Mary Rolle MD                Where to get your medicines      These medications were sent to [facility]/pharmacy #[id number] - [place] - [address] [facility]  [address] [place] [address]     Phone:  [phone]     tiZANidine 2 MG tablet                Primary Care  Provider Office Phone # Fax #    Mary Dena Rolle [phone] [address]  [facility] [address]        Equal Access to Services     MACY CORRAL : Hadii aad ku hadkeyuro Soomaali, waaxda luqadaha, qaybta kaalmada adeegyada, hank avilezofelia alarcon. So [facility] [phone].    ATENCIÓN: Si habla español, tiene a lentz disposición servicios gratuitos de asistencia lingüística. Llame al [phone].    We comply with applicable federal civil rights laws and [place] laws. We do not discriminate on the basis of race, color, national origin, age, disability, sex, sexual orientation, or gender identity.            Thank you!     Thank you for choosing [facility]  for your care. Our goal is always to provide you with excellent care. Hearing back from our patients is one way we can continue to improve our services. Please take a few minutes to complete the written survey that you may receive in the mail after your visit with us. Thank you!             Your Updated Medication List - Protect others around you: Learn how to safely use, store and throw away your medicines at www.disposemymeds.org.          This list is accurate as of 9/28/18  2:37 PM.  Always use your most recent med list.                   Brand Name Dispense Instructions for use Diagnosis    acetaminophen 500 MG tablet    TYLENOL     Take 500-1,000 mg by mouth as needed for mild pain        ALPRAZolam 0.25 MG tablet    XANAX    90 tablet    TAKE 1 TABLET BY MOUTH 3 TIMES DAILY AS NEEDED FOR ANXIETY    Generalized anxiety disorder       CALCIUM PO      Take 1,200 mg by mouth        diclofenac 1 % Gel topical gel    VOLTAREN    100 g    Apply  2 grams to hands four times daily using enclosed dosing card.    Iliotibial band syndrome, right, Strain of trapezius muscle, right, initial encounter       diclofenac 25 MG EC tablet    VOLTAREN    60 tablet    Take 1 tablet (25 mg) by mouth 2 times daily  Take with food only.    Strain of trapezius muscle, right, initial encounter, Iliotibial band syndrome, right       FLUZONE HIGH-DOSE 0.5 ML injection   Generic drug:  influenza Vac Split High-Dose      TO BE ADMINISTERED BY PHARMACIST FOR IMMUNIZATION        tiZANidine 2 MG tablet    ZANAFLEX    60 tablet    Take 1-2 tablets (2-4 mg) by mouth 3 times daily as needed for muscle spasms (or neck pain)    Neck pain

## 2018-09-28 NOTE — PATIENT INSTRUCTIONS
At [facility], we strive to deliver an exceptional experience to you, every time we see you.  If you receive a survey in the mail, please send us back your thoughts. We really do value your feedback.    Your care team:                            Family Medicine Internal Medicine   MD Dmitry Wing MD Shantel Branch-Fleming, MD Katya Georgiev PA-C Megan Hill, APRN CNP    Michael Glover MD Pediatrics   Misael Tenorio, YI Grayson, MD Chiquis Romero APRN CNP   MD Sarai Sexton MD Deborah Mielke, MD Dena Irvin, APRN [facility]      Clinic hours: Monday - Thursday 7 am-7 pm; Fridays 7 am-5 pm.   Urgent care: Monday - Friday 11 am-9 pm; Saturday and Sunday 9 am-5 pm.  Pharmacy : Monday -Thursday 8 am-8 pm; Friday 8 am-6 pm; Saturday and Sunday 9 am-5 pm.     Clinic: [phone]   Pharmacy: [phone]        Understanding Restless Legs Syndrome    Are you ever annoyed by a creeping or itching feeling in your legs? Do you often feel an urge to move your legs while sitting or lying in bed? This can keep you from falling asleep at night. You may then feel tired during the day. If you have these problems, talk to your healthcare provider. He or she can suggest a treatment plan and help you find ways to sleep better.  Restless legs syndrome (RLS)  RLS is a creeping, crawly, or jumpy feeling in the legs with an urge to move them. Symptoms of RLS often occur during periods of inactivity, such as when you sit or lie down at night. This discomfort can keep you from falling asleep. RLS is more common in older people and tends to run in families. Overuse of caffeine or alcohol may make symptoms worse. Iron deficiency, diabetes, or kidney problems can contribute to RLS.  Periodic limb movement syndrome (PLMS)  PLMS is sudden, repetitive leg jerking during sleep. The person you sleep with is often the one who notices it. Your legs may jerk many times  during the night. You and your partner may both have trouble sleeping and feel tired in the morning. PLMS shouldn t be confused with the normal leg or body twitching many people have when first falling asleep.  Treating these problems  If these problems are causing disrupted sleep and daytime symptoms, treatment may be needed. Possible treatments may include:    Avoiding medicines like antidepressants, antinausea medicine, and other medicines that block dopamine    Iron supplements    Prescribed medicines including pramipexole, ropinirole, ritigotine, pregbalin, cabergoline, levodopa, and clonidine    Lifestyle changes, such as regular exercise, controlling caffeine intake, alcohol, and smoking  Date Last Reviewed: 9/1/2017 2000-2017 [employer]. [address] [place] [address], [place] [address]. All rights reserved. This information is not intended as a substitute for professional medical care. Always follow your healthcare professional's instructions.

## 2018-12-27 ENCOUNTER — OFFICE VISIT (OUTPATIENT)
Dept: FAMILY MEDICINE | Facility: CLINIC | Age: 73
End: 2018-12-27
Payer: COMMERCIAL

## 2018-12-27 VITALS
TEMPERATURE: 98.5 F | DIASTOLIC BLOOD PRESSURE: 83 MMHG | SYSTOLIC BLOOD PRESSURE: 126 MMHG | WEIGHT: 128 LBS | BODY MASS INDEX: 25.13 KG/M2 | OXYGEN SATURATION: 97 % | HEIGHT: 60 IN | RESPIRATION RATE: 16 BRPM | HEART RATE: 81 BPM

## 2018-12-27 DIAGNOSIS — M54.2 CERVICALGIA: ICD-10-CM

## 2018-12-27 DIAGNOSIS — Z13.5 SCREENING FOR DIABETIC RETINOPATHY: ICD-10-CM

## 2018-12-27 DIAGNOSIS — R41.3 MEMORY LOSS: Primary | ICD-10-CM

## 2018-12-27 DIAGNOSIS — F41.1 GENERALIZED ANXIETY DISORDER: ICD-10-CM

## 2018-12-27 DIAGNOSIS — G44.209 TENSION HEADACHE: ICD-10-CM

## 2018-12-27 DIAGNOSIS — Z12.11 SCREEN FOR COLON CANCER: ICD-10-CM

## 2018-12-27 DIAGNOSIS — M47.12 CERVICAL SPONDYLOSIS WITH MYELOPATHY: ICD-10-CM

## 2018-12-27 PROCEDURE — 99214 OFFICE O/P EST MOD 30 MIN: CPT | Performed by: FAMILY MEDICINE

## 2018-12-27 RX ORDER — TIZANIDINE 2 MG/1
2-4 TABLET ORAL 2 TIMES DAILY PRN
Qty: 60 TABLET | Refills: 3 | Status: SHIPPED | OUTPATIENT
Start: 2018-12-27 | End: 2019-05-01

## 2018-12-27 RX ORDER — DONEPEZIL HYDROCHLORIDE 5 MG/1
5 TABLET, FILM COATED ORAL AT BEDTIME
Qty: 90 TABLET | Refills: 3 | Status: SHIPPED | OUTPATIENT
Start: 2018-12-27 | End: 2019-08-15

## 2018-12-27 RX ORDER — ACETAMINOPHEN 160 MG
1 TABLET,DISINTEGRATING ORAL DAILY
COMMUNITY
End: 2022-02-14

## 2018-12-27 RX ORDER — ACETAMINOPHEN 500 MG
500-1000 TABLET ORAL EVERY 8 HOURS PRN
Qty: 100 BOTTLE | Refills: 3 | Status: SHIPPED | OUTPATIENT
Start: 2018-12-27 | End: 2019-05-01

## 2018-12-27 ASSESSMENT — PAIN SCALES - GENERAL: PAINLEVEL: MILD PAIN (2)

## 2018-12-27 ASSESSMENT — MIFFLIN-ST. JEOR: SCORE: 1006.07

## 2018-12-27 ASSESSMENT — PATIENT HEALTH QUESTIONNAIRE - PHQ9: SUM OF ALL RESPONSES TO PHQ QUESTIONS 1-9: 2
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
## 2018-12-27 NOTE — PATIENT INSTRUCTIONS
At [facility], we strive to deliver an exceptional experience to you, every time we see you.  If you receive a survey in the mail, please send us back your thoughts. We really do value your feedback.    Based on your medical history, these are the current health maintenance/preventive care services that you are due for (some may have been done at this visit.)  Health Maintenance Due   Topic Date Due     ZOSTER IMMUNIZATION (1 of 2) 02/17/1994     EYE EXAM Q1 YEAR  01/26/2017     COLONOSCOPY Q5 YR  02/15/2018     PHQ-9 Q6 MONTHS  01/26/2019         Suggested websites for health information:  Www.[email or web address] : Up to date and easily searchable information on multiple topics.  Www.medlineplus.gov : medication info, interactive tutorials, watch real surgeries online  Www.familydoctor.org : good info from the Academy of Family Physicians  Www.cdc.gov : public health info, travel advisories, epidemics (H1N1)  Www.aap.org : children's health info, normal development, vaccinations  Www.health.[facility].[place].us : [place] dept of health, public health issues in [place], N1N1    Your care team:                            Family Medicine Internal Medicine   MD Dmitry Wing MD Shantel Branch-Fleming, MD Katya Georgiev PA-C Nam Ho, MD Pediatrics   YI Cullen, MD Sarai Rodgers CNP, MD Deborah Mielke, MD Kim Thein, APRN [facility]      Clinic hours: Monday - Thursday 7 am-7 pm; Fridays 7 am-5 pm.   Urgent care: Monday - Friday 11 am-9 pm; Saturday and Sunday 9 am-5 pm.  Pharmacy : Monday -Thursday 8 am-8 pm; Friday 8 am-6 pm; Saturday and Sunday 9 am-5 pm.     Clinic: [phone]   Pharmacy: [phone]

## 2019-05-01 ENCOUNTER — OFFICE VISIT (OUTPATIENT)
Dept: FAMILY MEDICINE | Facility: CLINIC | Age: 74
End: 2019-05-01
Payer: COMMERCIAL

## 2019-05-01 VITALS
HEART RATE: 89 BPM | DIASTOLIC BLOOD PRESSURE: 73 MMHG | SYSTOLIC BLOOD PRESSURE: 130 MMHG | OXYGEN SATURATION: 96 % | RESPIRATION RATE: 16 BRPM | HEIGHT: 60 IN | BODY MASS INDEX: 25.13 KG/M2 | TEMPERATURE: 98.5 F | WEIGHT: 128 LBS

## 2019-05-01 DIAGNOSIS — M54.2 NECK PAIN ON LEFT SIDE: Primary | ICD-10-CM

## 2019-05-01 PROCEDURE — 99214 OFFICE O/P EST MOD 30 MIN: CPT | Performed by: FAMILY MEDICINE

## 2019-05-01 RX ORDER — LIDOCAINE 4 G/G
1 PATCH TOPICAL EVERY 24 HOURS
Qty: 12 PATCH | Refills: 3 | Status: SHIPPED | OUTPATIENT
Start: 2019-05-01 | End: 2019-08-15

## 2019-05-01 RX ORDER — ALBUTEROL SULFATE 90 UG/1
2 AEROSOL, METERED RESPIRATORY (INHALATION) EVERY 6 HOURS
COMMUNITY
End: 2022-02-14

## 2019-05-01 RX ORDER — TIZANIDINE 2 MG/1
2-4 TABLET ORAL 2 TIMES DAILY PRN
Qty: 60 TABLET | Refills: 3 | Status: SHIPPED | OUTPATIENT
Start: 2019-05-01 | End: 2019-08-15

## 2019-05-01 RX ORDER — SENNOSIDES 8.6 MG
650 CAPSULE ORAL EVERY 8 HOURS PRN
Qty: 90 TABLET | Refills: 3 | Status: SHIPPED | OUTPATIENT
Start: 2019-05-01

## 2019-05-01 RX ORDER — DICLOFENAC POTASSIUM 50 MG/1
POWDER, FOR SOLUTION ORAL
COMMUNITY
End: 2022-02-14

## 2019-05-01 ASSESSMENT — PAIN SCALES - GENERAL: PAINLEVEL: MODERATE PAIN (5)

## 2019-05-01 ASSESSMENT — MIFFLIN-ST. JEOR: SCORE: 1001.07

## 2019-05-01 NOTE — PROGRESS NOTES
SUBJECTIVE:   Whitley Wesley is a 74 year old female who presents to clinic today for the following   health issues:      Headaches      Duration: few weeks    Description  Location: bilateral in the occipital area , and back left side  Character: squeezing pain  Frequency:  Once a day  Duration:  hours    Intensity:  moderate    Accompanying signs and symptoms: radiating down left side of neck to left arm    Precipitating or Alleviating factors:  Nausea/vomiting: no  Dizziness: no  Weakness or numbness: no  Visual changes: blurry  Fever: no   Sinus or URI symptoms no     History  Head trauma: no   Family history of migraines: no  Previous tests for headaches: no  Neurologist evaluations: no  Able to do daily activities when headache present: YES  Wake with headaches: YES  Daily pain medication use: YES  Any changes in: None.    Precipitating or Alleviating factors (light/sound/sleep/caffeine): None.    Therapies tried and outcome: tylenol    Outcome - usually effective  Frequent/daily pain medication use: YES      Additional history: as documented    Reviewed  and updated as needed this visit by clinical staff  Tobacco  Allergies  Meds  Med Hx  Surg Hx  Fam Hx  Soc Hx        Reviewed and updated as needed this visit by Provider         Patient Active Problem List   Diagnosis     Memory loss     CARDIOVASCULAR SCREENING; LDL GOAL LESS THAN 130     Advanced directives, counseling/discussion     Colon polyps     Cataracts, both eyes     Generalized anxiety disorder     Caregiver stress     Cervicalgia     Cervical spondylosis with myelopathy     DDD (degenerative disc disease), cervical     Hyperlipidemia LDL goal <160     Tension headache     Past Surgical History:   Procedure Laterality Date     ANKLE SURGERY Left 2007    Left ankle       Social History     Tobacco Use     Smoking status: Never Smoker     Smokeless tobacco: Never Used   Substance Use Topics     Alcohol use: No     Family History   Problem  "Relation Age of Onset     Cancer Mother         \"urinary\"     Cancer Brother         lung     Diabetes No family hx of      Hypertension No family hx of      Cerebrovascular Disease No family hx of      Thyroid Disease No family hx of      Glaucoma No family hx of      Macular Degeneration No family hx of            ROS:  Constitutional, HEENT, cardiovascular, pulmonary, GI, , musculoskeletal, neuro, skin, endocrine and psych systems are negative, except as otherwise noted.    OBJECTIVE:     /73 (BP Location: Left arm, Patient Position: Chair, Cuff Size: Adult Regular)   Pulse 89   Temp 98.5  F (36.9  C) (Oral)   Resp 16   Ht 1.53 m (5' 0.25\")   Wt 58.1 kg (128 lb)   SpO2 96%   BMI 24.79 kg/m    Body mass index is 24.79 kg/m .  GENERAL: healthy, alert and no distress  NECK: no adenopathy, no asymmetry, masses, or scars and thyroid normal to palpation  RESP: lungs clear to auscultation - no rales, rhonchi or wheezes  CV: regular rate and rhythm, normal S1 S2, no S3 or S4, no murmur, click or rub, no peripheral edema and peripheral pulses strong  ABDOMEN: soft, nontender, no hepatosplenomegaly, no masses and bowel sounds normal  MS: tenderness along left paraspinal cervical muscles and left upper trapezius muscle  Diagnostic Test Results:  none     ASSESSMENT/PLAN:     1. Neck pain on left side  Seems to be muscular origin on exam. Treat with tylenol scheduled every 8 hours, lidocaine patches and tizanidine as needed. Discussed trying heat. Discussed physical therapy but patient did not want this at this time. RTC as needed.  - tiZANidine (ZANAFLEX) 2 MG tablet; Take 1-2 tablets (2-4 mg) by mouth 2 times daily as needed for muscle spasms (or neck pain)  Dispense: 60 tablet; Refill: 3  - Lidocaine (LIDOCARE) 4 % Patch; Place 1 patch onto the skin every 24 hours  Dispense: 12 patch; Refill: 3  - acetaminophen (TYLENOL) 650 MG CR tablet; Take 1 tablet (650 mg) by mouth every 8 hours as needed for mild pain " or fever  Dispense: 90 tablet; Refill: 3    See Patient Instructions    Michael Glover MD, MD  [facility]

## 2019-05-01 NOTE — PATIENT INSTRUCTIONS
At [facility], we strive to deliver an exceptional experience to you, every time we see you.  If you receive a survey in the mail, please send us back your thoughts. We really do value your feedback.    Based on your medical history, these are the current health maintenance/preventive care services that you are due for (some may have been done at this visit.)  Health Maintenance Due   Topic Date Due     ZOSTER IMMUNIZATION (1 of 2) 02/17/1994     EYE EXAM Q1 YEAR  01/26/2017     COLONOSCOPY Q5 YR  02/15/2018     ADVANCE DIRECTIVE PLANNING Q5 YRS  03/28/2019         Suggested websites for health information:  Www.[facility].org : Up to date and easily searchable information on multiple topics.  Www.medlineplus.gov : medication info, interactive tutorials, watch real surgeries online  Www.familydoctor.org : good info from the Academy of Family Physicians  Www.cdc.gov : public health info, travel advisories, epidemics (H1N1)  Www.aap.org : children's health info, normal development, vaccinations  Www.health.[facility].[place].us : [place] dept of health, public health issues in [place], N1N1    Your care team:                            Family Medicine Internal Medicine   MD Dmitry Wing MD Shantel Branch-Fleming, MD Katya Georgiev PA-C Nam Ho, MD Pediatrics   YI Cullen, MD Sarai Rodgers CNP, MD Deborah Mielke, MD Kim Thein, APRN [facility]      Clinic hours: Monday - Thursday 7 am-7 pm; Fridays 7 am-5 pm.   Urgent care: Monday - Friday 11 am-9 pm; Saturday and Sunday 9 am-5 pm.  Pharmacy : Monday -Thursday 8 am-8 pm; Friday 8 am-6 pm; Saturday and Sunday 9 am-5 pm.     Clinic: [phone]   Pharmacy: [phone]

## 2019-06-06 ENCOUNTER — OFFICE VISIT (OUTPATIENT)
Dept: FAMILY MEDICINE | Facility: CLINIC | Age: 74
End: 2019-06-06
Payer: COMMERCIAL

## 2019-06-06 VITALS
TEMPERATURE: 98.4 F | OXYGEN SATURATION: 98 % | HEIGHT: 60 IN | BODY MASS INDEX: 25.45 KG/M2 | SYSTOLIC BLOOD PRESSURE: 132 MMHG | RESPIRATION RATE: 16 BRPM | WEIGHT: 129.6 LBS | HEART RATE: 75 BPM | DIASTOLIC BLOOD PRESSURE: 74 MMHG

## 2019-06-06 DIAGNOSIS — G44.209 TENSION HEADACHE: ICD-10-CM

## 2019-06-06 DIAGNOSIS — M35.3 POLYMYALGIA RHEUMATICA (H): ICD-10-CM

## 2019-06-06 DIAGNOSIS — M62.9 MUSCULOSKELETAL DISORDER INVOLVING UPPER TRAPEZIUS MUSCLE: Primary | ICD-10-CM

## 2019-06-06 DIAGNOSIS — Z12.11 SCREEN FOR COLON CANCER: ICD-10-CM

## 2019-06-06 PROCEDURE — 99214 OFFICE O/P EST MOD 30 MIN: CPT | Performed by: PHYSICIAN ASSISTANT

## 2019-06-06 RX ORDER — DICLOFENAC SODIUM 75 MG/1
75 TABLET, DELAYED RELEASE ORAL 2 TIMES DAILY PRN
Qty: 30 TABLET | Refills: 0 | Status: SHIPPED | OUTPATIENT
Start: 2019-06-06 | End: 2019-08-15

## 2019-06-06 RX ORDER — METHOCARBAMOL 750 MG/1
750 TABLET, FILM COATED ORAL 4 TIMES DAILY PRN
Qty: 45 TABLET | Refills: 0 | Status: SHIPPED | OUTPATIENT
Start: 2019-06-06 | End: 2019-06-06

## 2019-06-06 RX ORDER — TIZANIDINE 2 MG/1
2 TABLET ORAL 3 TIMES DAILY PRN
Qty: 45 TABLET | Refills: 1 | Status: SHIPPED | OUTPATIENT
Start: 2019-06-06 | End: 2019-08-15

## 2019-06-06 ASSESSMENT — MIFFLIN-ST. JEOR: SCORE: 1008.33

## 2019-06-06 ASSESSMENT — PAIN SCALES - GENERAL: PAINLEVEL: NO PAIN (0)

## 2019-06-06 ASSESSMENT — PATIENT HEALTH QUESTIONNAIRE - PHQ9: SUM OF ALL RESPONSES TO PHQ QUESTIONS 1-9: 0

## 2019-06-06 NOTE — PATIENT INSTRUCTIONS
Diclofenac 75 mg twice a day   Tizanidine 2  mg three times a day as needed for muscle tightness, spasms  Apply heating pad   Patient Education     Tension Headaches     Massaging your neck muscles can help relieve tension headache pain.     Tension headaches cause a dull, steady pain on both sides of the head and in the neck and the back of the head. The eyes may also feel tired. Tension headaches can be triggered by muscle tension and clenching, lack of sleep, poor posture, eyestrain, stress, depression, anxiety, arthritis of the neck, and other factors.  To help prevent tension headaches  Take the following steps:    Make sure your work area is properly set up to help you avoid neck strain and eyestrain.    Make sure that your eyeglass prescription is current and is appropriate for the work you do.    Learn techniques for relaxing and reducing emotional stress. These include deep breathing, progressive relaxation, yoga, meditation, and biofeedback.    Maintain a regular exercise regimen under the guidance of a doctor. This can help keep your neck and back flexible, strong, and relaxed.  To relieve the pain  Take the following steps:    Use moist heat to relax the muscles. Soak in a hot bath or wrap a warm, moist towel around your neck.    Brush your scalp lightly with a soft hairbrush.    Give yourself a massage. Knead the muscles running from your shoulders up the back of your skull.    Use an ice pack. Apply this directly to the place where you feel pain.    Rest. Sleeping often helps relieve headache pain.    Drink plenty of fluids. Dehydration is another trigger for headaches. Don't drink alcohol as this will worsen dehydration.    Use pain medicine sparingly for moderate to severe pain.   Date Last Reviewed: 12/1/2017 2000-2018 [employer]. [address] [place] [address], [place] [address]. All rights reserved. This information is not intended as a substitute for professional medical care. Always  follow your healthcare professional's instructions.

## 2019-06-06 NOTE — PROGRESS NOTES
"Subjective     Whitley Wesley is a 74 year old female who presents to clinic today for the following health issues:    HPI   Headache, upper shoulder pain  Onset: a while now     Description:   Location: top    Character: squeezing pain  Frequency:  intermittent  Duration:  A few hours     Intensity: moderate    Progression of Symptoms:  constant    Accompanying Signs & Symptoms:  Stiff neck: no  Neck or upper back pain: YES  Fever: no  Sinus pressure: no  Nausea or vomiting: no  Dizziness: no  Numbness: no  Weakness: no  Visual changes: no    History:   Head trauma: no  Family history of migraines: no  Previous tests for headaches: no  Neurologist evaluations: no  Able to do daily activities: YES  Wake with a headaches: YES  Do headaches wake you up: no  Daily pain medication use: YES-tylenol helps, not today   Work/school stressors/changes: no    Precipitating factors:   Does light make it worse: no  Does sound make it worse: no    Alleviating factors:  Does sleep help: YES    Therapies Tried and outcome: Tylenol      Patient Active Problem List   Diagnosis     Memory loss     CARDIOVASCULAR SCREENING; LDL GOAL LESS THAN 130     Advanced directives, counseling/discussion     Colon polyps     Cataracts, both eyes     Generalized anxiety disorder     Caregiver stress     Cervicalgia     Cervical spondylosis with myelopathy     DDD (degenerative disc disease), cervical     Hyperlipidemia LDL goal <160     Tension headache     Polymyalgia rheumatica (H)     Past Surgical History:   Procedure Laterality Date     ANKLE SURGERY Left 2007    Left ankle       Social History     Tobacco Use     Smoking status: Never Smoker     Smokeless tobacco: Never Used   Substance Use Topics     Alcohol use: No     Family History   Problem Relation Age of Onset     Cancer Mother         \"urinary\"     Cancer Brother         lung     Diabetes No family hx of      Hypertension No family hx of      Cerebrovascular Disease No family hx of  " "    Thyroid Disease No family hx of      Glaucoma No family hx of      Macular Degeneration No family hx of          Current Outpatient Medications   Medication Sig Dispense Refill     acetaminophen (TYLENOL) 650 MG CR tablet Take 1 tablet (650 mg) by mouth every 8 hours as needed for mild pain or fever 90 tablet 3     albuterol (PROAIR HFA/PROVENTIL HFA/VENTOLIN HFA) 108 (90 Base) MCG/ACT inhaler Inhale 2 puffs into the lungs every 6 hours       Cholecalciferol (VITAMIN D3) 2000 units CAPS Take 1 capsule by mouth daily       diclofenac (VOLTAREN) 75 MG EC tablet Take 1 tablet (75 mg) by mouth 2 times daily as needed for moderate pain 30 tablet 0     Diclofenac Potassium 50 MG PACK        donepezil (ARICEPT) 5 MG tablet Take 1 tablet (5 mg) by mouth At Bedtime 90 tablet 3     Lidocaine (LIDOCARE) 4 % Patch Place 1 patch onto the skin every 24 hours 12 patch 3     tiZANidine (ZANAFLEX) 2 MG tablet Take 1 tablet (2 mg) by mouth 3 times daily as needed for muscle spasms 45 tablet 1     tiZANidine (ZANAFLEX) 2 MG tablet Take 1-2 tablets (2-4 mg) by mouth 2 times daily as needed for muscle spasms (or neck pain) 60 tablet 3     Allergies   Allergen Reactions     No Known Drug Allergies      Reviewed and updated as needed this visit by Provider  Tobacco  Allergies  Meds  Problems  Med Hx  Surg Hx  Fam Hx         Review of Systems   ROS COMP: Constitutional, HEENT, cardiovascular, pulmonary, gi and gu systems are negative, except as otherwise noted.      Objective    /74 (BP Location: Left arm, Patient Position: Sitting, Cuff Size: Adult Regular)   Pulse 75   Temp 98.4  F (36.9  C) (Oral)   Resp 16   Ht 1.53 m (5' 0.25\")   Wt 58.8 kg (129 lb 9.6 oz)   SpO2 98%   Breastfeeding? No   BMI 25.10 kg/m    Body mass index is 25.1 kg/m .  Physical Exam   GENERAL: healthy, alert and no distress  EYES: Eyes grossly normal to inspection, PERRL and conjunctivae and sclerae normal  HENT: ear canals and TM's normal, " "nose and mouth without ulcers or lesions  NECK: no adenopathy, no asymmetry, masses, or scars and thyroid normal to palpation  RESP: lungs clear to auscultation - no rales, rhonchi or wheezes  CV: regular rate and rhythm, normal S1 S2, no S3 or S4, no murmur, click or rub, no peripheral edema and peripheral pulses strong  ABDOMEN: soft, nontender, no hepatosplenomegaly, no masses and bowel sounds normal  MS: LUE exam shows normal strength and muscle mass, no deformities, no erythema, induration, or nodules, no evidence of joint effusion, ROM of all joints is normal and no evidence of joint instability, neck exam shows no torticollis, but tenderness over the left cervical paraspinal muscles and upper trapezius, muscle knot is palpated and spine exam shows no scoliosis, ROM is normal and straight  NEURO: Normal strength and tone, mentation intact and speech normal    Diagnostic Test Results:  Labs reviewed in Epic        Assessment & Plan       ICD-10-CM    1. Musculoskeletal disorder involving upper trapezius muscle M53.82 diclofenac (VOLTAREN) 75 MG EC tablet     tiZANidine (ZANAFLEX) 2 MG tablet     DISCONTINUED: methocarbamol (ROBAXIN) 750 MG tablet   2. Tension headache G44.209 diclofenac (VOLTAREN) 75 MG EC tablet     tiZANidine (ZANAFLEX) 2 MG tablet     DISCONTINUED: methocarbamol (ROBAXIN) 750 MG tablet   3. Polymyalgia rheumatica (H) M35.3    4. Screen for colon cancer Z12.11 DEXA HIP/PELVIS/SPINE - Future     Fecal colorectal cancer screen FIT - Future (S+30)     1.2. Diclofenac 75 mg twice a day   Tizanidine 2  mg three times a day as needed for muscle tightness, spasms  Apply heating pad   Patient declined physical therapy at this time  3. Regular exercise and the above medications will help  BMI:   Estimated body mass index is 25.1 kg/m  as calculated from the following:    Height as of this encounter: 1.53 m (5' 0.25\").    Weight as of this encounter: 58.8 kg (129 lb 9.6 oz).   Weight management plan: " Discussed healthy diet and exercise guidelines      Return in about 1 week (around 6/13/2019).    Shana Joseph PA-C  [facility]

## 2019-07-17 ENCOUNTER — TELEPHONE (OUTPATIENT)
Dept: FAMILY MEDICINE | Facility: CLINIC | Age: 74
End: 2019-07-17

## 2019-07-17 DIAGNOSIS — R41.3 MEMORY LOSS: Primary | ICD-10-CM

## 2019-07-17 NOTE — TELEPHONE ENCOUNTER
Reason for Call:  Other     Detailed comments: Needs memory loss discussion with POA.     Phone Number can be reached at: Xiomy Menjivar POA  [phone]     Please call after noon on 7/18.    Best Time: any    Can we leave a detailed message on this number? YES    Call taken on 7/17/2019 at 4:14 PM by Angela Mayers

## 2019-07-18 NOTE — TELEPHONE ENCOUNTER
I will put in a referral to care coordinator. This is general information- anyone can make a report to adult protection when they feel that someone has memory loss and is in a situation that is harmful to their health. This can be a family member.   I will follow up on patient- but we can't say this to the sister due to HIPPA rules. General advise is OK and I can get this information, for which I am appreciative.  Mary Rolle MD

## 2019-07-18 NOTE — TELEPHONE ENCOUNTER
Xiomy returned call    Best number to reach caller: Other phone number:  [phone]    Is it ok to leave a detailed message: YES
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
## 2019-07-18 NOTE — TELEPHONE ENCOUNTER
This writer attempted to contact Xiomy on 07/18/19      Reason for call no record of POA or consent to communicate on file - collect information only, cannot relay any information to Xiomy. Encourage patient and Xiomy to provide documentation for POA/consent to communicate if patient wishes and left message.      If patient calls back:   Registered Nurse called. Follow Triage Call workflow        DORINA KnutsonN, RN, PHN

## 2019-07-19 ENCOUNTER — PATIENT OUTREACH (OUTPATIENT)
Dept: CARE COORDINATION | Facility: CLINIC | Age: 74
End: 2019-07-19

## 2019-07-19 ASSESSMENT — ACTIVITIES OF DAILY LIVING (ADL): DEPENDENT_IADLS:: INDEPENDENT

## 2019-07-19 NOTE — LETTER
[place] CARE COORDINATION - [facility]  [address] [place] [address]    July 19, 2019    Whitley Wesley  [address]   [facility] [address]      Dear Whitley,    I am a clinic care coordinator who works with Mary Rolle MD at [facility]. I wanted to thank you for spending the time to talk with me.  I wanted to introduce myself and provide you with my contact information so that you can call me with questions or concerns about your health care. Below is a description of clinic care coordination and how I can further assist you.     The clinic care coordinator is a registered nurse and/or  who understand the health care system. The goal of clinic care coordination is to help you manage your health and improve access to the [facility] system in the most efficient manner. The care coordinator can assist you in meeting your health care goals by providing education, resources, coordinating services, and strengthening the communication among your providers.     Please feel free to contact me at [phone], with any questions or concerns. We at [facility] are focused on providing you with the highest-quality healthcare experience possible and that all starts with you.     Sincerely,     LAURENCE Alonso    Enclosed: I have enclosed an Authorization to Discuss Protected Health Information form. Please review, add any appropriate people, sign and send back to me so I can make sure we have this on file to be able to talk to family/friends if you would like us to be able to.

## 2019-07-19 NOTE — LETTER
[facility]  Complex Care Plan  About Me:    Patient Name:  Whitley Wesley    YOB: 1945  Age:         74 year old   Silvia MRN:    [id number] Telephone Information:  Home Phone [phone]   Mobile [phone]       Address:  [address]  [facility] [address] Email address:  No e-mail address on record      Emergency Contact(s)    Name Relationship Lgl Grd Work Phone Home Phone Mobile Phone   1. YANG WESLEY Spouse No NONE [phone] NONE   2. NO SECONDACRY *   NONE NONE NONE           Primary language:  English     needed? No   [facility] [employer]:  [phone] op. 1  Other communication barriers: Cognitive impairment  Preferred Method of Communication:  Mail  Current living arrangement: I live in a private home with spouse  Mobility Status/ Medical Equipment: Independent    Health Maintenance  Health Maintenance Reviewed: Due/Overdue   Health Maintenance Due   Topic Date Due     ZOSTER IMMUNIZATION (1 of 2) 02/17/1994     EYE EXAM  01/26/2017     COLONOSCOPY  02/15/2018     ADVANCE CARE PLANNING  03/28/2019     MEDICARE ANNUAL WELLNESS VISIT  07/26/2019     FALL RISK ASSESSMENT  08/16/2019       My Access Plan  Medical Emergency 911   Primary Clinic Line [facility] - [phone]   24 Hour Appointment Line [phone] or  [phone]) (toll-free)   24 Hour Nurse Line [phone] (toll-free)   Preferred Urgent Care [facility] [phone]   [facility]  [phone]   Preferred Pharmacy [id number] - [place], [place] - [address] [facility]     Behavioral Health Crisis Line The National Suicide Prevention Lifeline at 1-331.185.8005 or 911             My Care Team Members  Patient Care Team       Relationship Specialty Notifications Start End    Mary Rolle MD PCP - General Family Practice  10/8/13     Phone: [phone] Fax:  [phone]         [address] [place] [address]    Mary Rolle MD Assigned PCP   8/11/13     Phone: [phone] Fax: [phone]         [address] [place] [address]    Namita Alaniz LSW Lead Care Coordinator Primary Care - CC Admissions 7/19/19     Phone: [phone]                 My Care Plans  Self Management and Treatment Plan  Goals and (Comments)  Goals        General    #1 Medical (pt-stated)     Notes - Note edited  7/19/2019 12:07 PM by Namita Alaniz LSW    Goal Statement: I am not sure I need to see a Neurologist but willing to consider it to address my tension headaches.   Measure of Success: Patient will consider rescheduling cancelled neurology appointment.  Supportive Steps to Achieve: Offered to assist patient with rescheduling neurology appt with patient.  Barriers: Lack of support from spouse.  Strengths: Per chart review family support.  Date to Achieve By: 9/1/19  Patient expressed understanding of goal: Yes             Action Plans on File:  Depression     Advance Care Plans/Directives Type:        My Medical and Care Information  Problem List   Patient Active Problem List   Diagnosis     Memory loss     CARDIOVASCULAR SCREENING; LDL GOAL LESS THAN 130     Advanced directives, counseling/discussion     Colon polyps     Cataracts, both eyes     Generalized anxiety disorder     Caregiver stress     Cervicalgia     Cervical spondylosis with myelopathy     DDD (degenerative disc disease), cervical     Hyperlipidemia LDL goal <160     Tension headache     Polymyalgia rheumatica (H)      Current Medications and Allergies:  See printed Medication Report.    Care Coordination Start Date: 7/19/2019   Frequency of Care Coordination: weekly   Form Last Updated: 07/19/2019
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
## 2019-07-22 ENCOUNTER — OFFICE VISIT (OUTPATIENT)
Dept: OPTOMETRY | Facility: CLINIC | Age: 74
End: 2019-07-22
Payer: COMMERCIAL

## 2019-07-22 DIAGNOSIS — H52.4 PRESBYOPIA: ICD-10-CM

## 2019-07-22 DIAGNOSIS — H52.11 MYOPIA, RIGHT: ICD-10-CM

## 2019-07-22 DIAGNOSIS — H25.13 NUCLEAR AGE-RELATED CATARACT, BOTH EYES: ICD-10-CM

## 2019-07-22 DIAGNOSIS — Z01.01 ENCOUNTER FOR EXAMINATION OF EYES AND VISION WITH ABNORMAL FINDINGS: Primary | ICD-10-CM

## 2019-07-22 DIAGNOSIS — H52.223 REGULAR ASTIGMATISM OF BOTH EYES: ICD-10-CM

## 2019-07-22 PROCEDURE — 92004 COMPRE OPH EXAM NEW PT 1/>: CPT | Performed by: OPTOMETRIST

## 2019-07-22 PROCEDURE — 92015 DETERMINE REFRACTIVE STATE: CPT | Performed by: OPTOMETRIST

## 2019-07-22 ASSESSMENT — VISUAL ACUITY
OD_SC: 20/25
OS_SC: 20/80
METHOD: SNELLEN - LINEAR
OD_SC: 20/120
OS_CC+: -1
CORRECTION_TYPE: GLASSES
OD_CC: 20/60
OS_CC: 20/30
OD_CC: 20/25
OS_CC: 20/60
OS_SC: 20/70

## 2019-07-22 ASSESSMENT — REFRACTION_WEARINGRX
SPECS_TYPE: PAL
OD_CYLINDER: +0.75
OD_SPHERE: -0.50
OD_ADD: +2.50
OD_AXIS: 178
OS_AXIS: 166
OS_ADD: +2.50
OS_CYLINDER: +1.50
OS_SPHERE: PLANO

## 2019-07-22 ASSESSMENT — CONF VISUAL FIELD
OS_NORMAL: 1
OD_NORMAL: 1

## 2019-07-22 ASSESSMENT — TONOMETRY
IOP_METHOD: APPLANATION
OD_IOP_MMHG: 18
OS_IOP_MMHG: 17

## 2019-07-22 ASSESSMENT — SLIT LAMP EXAM - LIDS
COMMENTS: NORMAL
COMMENTS: NORMAL

## 2019-07-22 ASSESSMENT — EXTERNAL EXAM - RIGHT EYE: OD_EXAM: NORMAL

## 2019-07-22 ASSESSMENT — REFRACTION_MANIFEST
OS_CYLINDER: +1.50
OD_SPHERE: -0.50
OS_AXIS: 170
OD_CYLINDER: +0.75
OS_SPHERE: PLANO
OD_ADD: +2.75
OS_ADD: +2.75
OD_AXIS: 167

## 2019-07-22 ASSESSMENT — EXTERNAL EXAM - LEFT EYE: OS_EXAM: NORMAL

## 2019-07-22 ASSESSMENT — CUP TO DISC RATIO
OS_RATIO: 0.2
OD_RATIO: 0.2

## 2019-07-22 NOTE — PROGRESS NOTES
Chief Complaint   Patient presents with     Annual Eye Exam      Accompanied by self  Last Eye Exam: 3.5 years ago  Dilated Previously: Yes    What are you currently using to see?  Glasses- 3+ years old       Distance Vision Acuity: Satisfied with vision    Near Vision Acuity: Not satisfied ,small print is difficult    Eye Comfort: dry and itchy  Do you use eye drops? : No  Occupation or Hobbies: retired    Anastasia Wolfe, Optometric Tech          Medical, surgical and family histories reviewed and updated 7/22/2019.       OBJECTIVE: See Ophthalmology exam    ASSESSMENT:    ICD-10-CM    1. Encounter for examination of eyes and vision with abnormal findings Z01.01    2. Nuclear age-related cataract, both eyes H25.13    3. Myopia, right H52.11    4. Regular astigmatism of both eyes H52.223    5. Presbyopia H52.4       PLAN:     Patient Instructions   You have the formation of cataracts.  You may notice some blurred vision or glare with night driving.  It is important that you wear good sunglasses to protect your eyes from the ultraviolet light from the sun. I recommend that you return in 1 year for an eye exam unless there are any sudden changes in your vision.       Whitley was advised of today's exam findings.  Optional to fill new glasses prescription  Allow 2 weeks to adapt to change in glasses  Wear glasses full time  Copy of glasses Rx provided today.  Return in 1 year for eye exam, or sooner if needed.    The effects of the dilating drops last for 4- 6 hours.  You will be more sensitive to light and vision will be blurry up close.  Mydriatic sunglasses were given if needed.      Christiano Hand O.D.  [facility]  [address] [place] [address]. [place] Ernst  [id number]    [phone]

## 2019-07-22 NOTE — TELEPHONE ENCOUNTER
Camelia from [facility] adult protection intake would like a call back regarding the report that was filled. Please call [phone] - ok to leave a message

## 2019-07-22 NOTE — TELEPHONE ENCOUNTER
Noted. Email requesting call was sent directly to  as well. Called and spoke with Camelia, appropriate questions answered.    Jessica Lynch RN

## 2019-07-22 NOTE — LETTER
7/22/2019         RE: Whitley Wesley  [address]  [facility] [address]        Dear Colleague,    Thank you for referring your patient, Whitley Wesley, to the [facility]. Please see a copy of my visit note below.    Chief Complaint   Patient presents with     Annual Eye Exam      Accompanied by self  Last Eye Exam: 3.5 years ago  Dilated Previously: Yes    What are you currently using to see?  Glasses- 3+ years old       Distance Vision Acuity: Satisfied with vision    Near Vision Acuity: Not satisfied ,small print is difficult    Eye Comfort: dry and itchy  Do you use eye drops? : No  Occupation or Hobbies: retired    Anastasia Wolfe, Optometric Tech          Medical, surgical and family histories reviewed and updated 7/22/2019.       OBJECTIVE: See Ophthalmology exam    ASSESSMENT:    ICD-10-CM    1. Encounter for examination of eyes and vision with abnormal findings Z01.01    2. Nuclear age-related cataract, both eyes H25.13    3. Myopia, right H52.11    4. Regular astigmatism of both eyes H52.223    5. Presbyopia H52.4       PLAN:     Patient Instructions   You have the formation of cataracts.  You may notice some blurred vision or glare with night driving.  It is important that you wear good sunglasses to protect your eyes from the ultraviolet light from the sun. I recommend that you return in 1 year for an eye exam unless there are any sudden changes in your vision.       Whitley was advised of today's exam findings.  Optional to fill new glasses prescription  Allow 2 weeks to adapt to change in glasses  Wear glasses full time  Copy of glasses Rx provided today.  Return in 1 year for eye exam, or sooner if needed.    The effects of the dilating drops last for 4- 6 hours.  You will be more sensitive to light and vision will be blurry up close.  Mydriatic sunglasses were given if needed.      Christiano Hand O.D.  [facility]  [address] [place]   [address]    [phone]           Again, thank you for allowing me to participate in the care of your patient.        Sincerely,        Christiano Hand OD

## 2019-08-01 ENCOUNTER — PATIENT OUTREACH (OUTPATIENT)
Dept: CARE COORDINATION | Facility: CLINIC | Age: 74
End: 2019-08-01

## 2019-08-01 ASSESSMENT — ACTIVITIES OF DAILY LIVING (ADL): DEPENDENT_IADLS:: INDEPENDENT

## 2019-08-01 NOTE — PROGRESS NOTES
"Clinic Care Coordination Contact  Care Team Conversations    SW outreaches to follow up on compliance with rescheduling neurology appt. Speaks with patient who reports she is no longer a patient of the [facility] and reports she does not need any help from a .    Plan: Patient declines Care Coordination services. No further outreaches. SW will close patient out to Care Coordination at this time. If patient returns to clinic and has psychosocial needs, please place Care Coordination Referral.     Namita Alaniz, [facility]   Care Coordination  Christiana Serrano Rogers  [phone]    ADDENDUM: Later in the day, SW receives incoming call from patient indicating that she \"made a big mistake,\" indicating that she does not want to leave the [facility]. Patient indicates she doesn't know what she was thinking earlier. SW reminds patient why SW was contacting patient earlier - to follow up on assistance in rescheduling cancelled neurology appt. Patient declines reporting she is not interested, (Triage RN filed [facility] report on 7/19/19). SW offers to schedule office visit with PCP; patient declines. SW assesses for other needs; patient denies and declines Care Coordination services.   "

## 2019-08-15 ENCOUNTER — OFFICE VISIT (OUTPATIENT)
Dept: FAMILY MEDICINE | Facility: CLINIC | Age: 74
End: 2019-08-15
Payer: COMMERCIAL

## 2019-08-15 VITALS
HEART RATE: 91 BPM | TEMPERATURE: 98.9 F | SYSTOLIC BLOOD PRESSURE: 117 MMHG | HEIGHT: 60 IN | OXYGEN SATURATION: 95 % | WEIGHT: 128 LBS | BODY MASS INDEX: 25.13 KG/M2 | RESPIRATION RATE: 16 BRPM | DIASTOLIC BLOOD PRESSURE: 74 MMHG

## 2019-08-15 DIAGNOSIS — F41.1 GENERALIZED ANXIETY DISORDER: ICD-10-CM

## 2019-08-15 DIAGNOSIS — Z63.6 CAREGIVER STRESS: ICD-10-CM

## 2019-08-15 DIAGNOSIS — M35.3 POLYMYALGIA RHEUMATICA (H): ICD-10-CM

## 2019-08-15 DIAGNOSIS — E78.5 HYPERLIPIDEMIA LDL GOAL <160: ICD-10-CM

## 2019-08-15 DIAGNOSIS — R35.0 URINARY FREQUENCY: ICD-10-CM

## 2019-08-15 DIAGNOSIS — Z12.11 SCREEN FOR COLON CANCER: ICD-10-CM

## 2019-08-15 DIAGNOSIS — R41.3 MEMORY LOSS: Primary | ICD-10-CM

## 2019-08-15 LAB
ALBUMIN SERPL-MCNC: 3.5 G/DL (ref 3.4–5)
ALBUMIN UR-MCNC: NEGATIVE MG/DL
ALP SERPL-CCNC: 82 U/L (ref 40–150)
ALT SERPL W P-5'-P-CCNC: 22 U/L (ref 0–50)
ANION GAP SERPL CALCULATED.3IONS-SCNC: 5 MMOL/L (ref 3–14)
APPEARANCE UR: CLEAR
AST SERPL W P-5'-P-CCNC: 14 U/L (ref 0–45)
BACTERIA #/AREA URNS HPF: ABNORMAL /HPF
BILIRUB SERPL-MCNC: 0.5 MG/DL (ref 0.2–1.3)
BILIRUB UR QL STRIP: NEGATIVE
BUN SERPL-MCNC: 21 MG/DL (ref 7–30)
CALCIUM SERPL-MCNC: 9.2 MG/DL (ref 8.5–10.1)
CAOX CRY #/AREA URNS HPF: ABNORMAL /HPF
CHLORIDE SERPL-SCNC: 112 MMOL/L (ref 94–109)
CO2 SERPL-SCNC: 28 MMOL/L (ref 20–32)
COLOR UR AUTO: YELLOW
CREAT SERPL-MCNC: 0.66 MG/DL (ref 0.52–1.04)
CRP SERPL-MCNC: <2.9 MG/L (ref 0–8)
ERYTHROCYTE [DISTWIDTH] IN BLOOD BY AUTOMATED COUNT: 13.3 % (ref 10–15)
ERYTHROCYTE [SEDIMENTATION RATE] IN BLOOD BY WESTERGREN METHOD: 8 MM/H (ref 0–30)
FOLATE SERPL-MCNC: 22 NG/ML
GFR SERPL CREATININE-BSD FRML MDRD: 86 ML/MIN/{1.73_M2}
GLUCOSE SERPL-MCNC: 102 MG/DL (ref 70–99)
GLUCOSE UR STRIP-MCNC: NEGATIVE MG/DL
HCT VFR BLD AUTO: 44.3 % (ref 35–47)
HGB BLD-MCNC: 14.7 G/DL (ref 11.7–15.7)
HGB UR QL STRIP: ABNORMAL
KETONES UR STRIP-MCNC: ABNORMAL MG/DL
LEUKOCYTE ESTERASE UR QL STRIP: NEGATIVE
MCH RBC QN AUTO: 31.7 PG (ref 26.5–33)
MCHC RBC AUTO-ENTMCNC: 33.2 G/DL (ref 31.5–36.5)
MCV RBC AUTO: 96 FL (ref 78–100)
MUCOUS THREADS #/AREA URNS LPF: PRESENT /LPF
NITRATE UR QL: NEGATIVE
NON-SQ EPI CELLS #/AREA URNS LPF: ABNORMAL /LPF
PH UR STRIP: 5 PH (ref 5–7)
PLATELET # BLD AUTO: 266 10E9/L (ref 150–450)
POTASSIUM SERPL-SCNC: 3.6 MMOL/L (ref 3.4–5.3)
PROT SERPL-MCNC: 6.7 G/DL (ref 6.8–8.8)
RBC # BLD AUTO: 4.63 10E12/L (ref 3.8–5.2)
RBC #/AREA URNS AUTO: ABNORMAL /HPF
SODIUM SERPL-SCNC: 145 MMOL/L (ref 133–144)
SOURCE: ABNORMAL
SP GR UR STRIP: >1.03 (ref 1–1.03)
TSH SERPL DL<=0.005 MIU/L-ACNC: 2.33 MU/L (ref 0.4–4)
UROBILINOGEN UR STRIP-ACNC: 0.2 EU/DL (ref 0.2–1)
VIT B12 SERPL-MCNC: 499 PG/ML (ref 193–986)
WBC # BLD AUTO: 7.7 10E9/L (ref 4–11)
WBC #/AREA URNS AUTO: ABNORMAL /HPF

## 2019-08-15 PROCEDURE — 99214 OFFICE O/P EST MOD 30 MIN: CPT | Performed by: FAMILY MEDICINE

## 2019-08-15 PROCEDURE — 81001 URINALYSIS AUTO W/SCOPE: CPT | Performed by: FAMILY MEDICINE

## 2019-08-15 PROCEDURE — 80053 COMPREHEN METABOLIC PANEL: CPT | Performed by: FAMILY MEDICINE

## 2019-08-15 PROCEDURE — 84443 ASSAY THYROID STIM HORMONE: CPT | Performed by: FAMILY MEDICINE

## 2019-08-15 PROCEDURE — 85027 COMPLETE CBC AUTOMATED: CPT | Performed by: FAMILY MEDICINE

## 2019-08-15 PROCEDURE — 82746 ASSAY OF FOLIC ACID SERUM: CPT | Performed by: FAMILY MEDICINE

## 2019-08-15 PROCEDURE — 82607 VITAMIN B-12: CPT | Performed by: FAMILY MEDICINE

## 2019-08-15 PROCEDURE — 86140 C-REACTIVE PROTEIN: CPT | Performed by: FAMILY MEDICINE

## 2019-08-15 PROCEDURE — 36415 COLL VENOUS BLD VENIPUNCTURE: CPT | Performed by: FAMILY MEDICINE

## 2019-08-15 PROCEDURE — 85652 RBC SED RATE AUTOMATED: CPT | Performed by: FAMILY MEDICINE

## 2019-08-15 SDOH — SOCIAL STABILITY - SOCIAL INSECURITY: DEPENDENT RELATIVE NEEDING CARE AT HOME: Z63.6

## 2019-08-15 ASSESSMENT — PAIN SCALES - GENERAL: PAINLEVEL: NO PAIN (0)

## 2019-08-15 ASSESSMENT — MIFFLIN-ST. JEOR: SCORE: 1001.07

## 2019-08-15 NOTE — PATIENT INSTRUCTIONS
At [facility], we strive to deliver an exceptional experience to you, every time we see you.  If you receive a survey in the mail, please send us back your thoughts. We really do value your feedback.    Based on your medical history, these are the current health maintenance/preventive care services that you are due for (some may have been done at this visit.)  Health Maintenance Due   Topic Date Due     ZOSTER IMMUNIZATION (1 of 2) 02/17/1994     COLONOSCOPY  02/15/2018     ADVANCE CARE PLANNING  03/28/2019     MEDICARE ANNUAL WELLNESS VISIT  07/26/2019         Suggested websites for health information:  Www.[employer]l.[employer] : Up to date and easily searchable information on multiple topics.  Www.medlineplus.gov : medication info, interactive tutorials, watch real surgeries online  Www.familydoctor.org : good info from the Academy of Family Physicians  Www.cdc.gov : public health info, travel advisories, epidemics (H1N1)  Www.aap.org : children's health info, normal development, vaccinations  Www.health.Replaced by [facility].[place].us : [place] dept of health, public health issues in [place], N1N1    Your care team:                            Family Medicine Internal Medicine   MD Dmitry Wing MD Shantel Branch-Fleming, MD Katya Georgiev PA-C Nam Ho, MD Pediatrics   YI Cullen, MD Sarai Rodgers CNP, MD Deborah Mielke, MD Kim Thein, APRN CNP      Clinic hours: Monday - Thursday 7 am-7 pm; Fridays 7 am-5 pm.   Urgent care: Monday - Friday 11 am-9 pm; Saturday and Sunday 9 am-5 pm.  Pharmacy : Monday -Thursday 8 am-8 pm; Friday 8 am-6 pm; Saturday and Sunday 9 am-5 pm.     Clinic: [phone]   Pharmacy: [phone]    Patient Education     Coping with Memory Loss  What happens when you have memory loss?  Memory loss causes problems with thinking, learning and memory. You may feel forgetful or have trouble focusing on  "tasks.  Memory loss may be a side effect of medicine, chemotherapy or radiation. In these cases, problems with memory may improve after you finish your treatment. But you could have long-term problems.  Other factors that affect memory and your ability to focus include:    Aging    Illness    Depression    Hormonal changes    Anemia (low red blood cells)    Stress.  What can I do to treat or prevent memory loss?  Problems with thinking and memory can be very frustrating. There is no standard treatment at this time. But there are things you can do to reduce the effects of memory loss:    Really pay attention. Use your eyes, ears and sense of touch to remember things. Focus when someone tells you something.    Limit distractions when you need to complete tasks that require you to focus.    Tell yourself what you are doing as you do it. For example, if you're going out for a few hours, as you close the door tell yourself, \"I'm locking the door now and putting the key in my pocket so I don't have to worry about whether I locked the door.\"    Give yourself clear reminders. If you need to buy dog food, put the empty bag at the door so you'll see it as you leave the house. Or write yourself a note and put it where it's needed.    Keep things in the same place. This way you don't have to wonder where you put your keys, remote control or medicine.    Keep a journal of daily events and activities. Carry a notebook and write down important information that you want to remember.    Use helpful tools, such as:  ? A daily planner  ? A wall calendar  ? Checklists  ? Sticky notes  ? A  near the door  ? A pre-programmed phone  ? A wristwatch with an alarm  ? A pill box to keep track of your medicines.    Get plenty of sleep and exercise each day.    Stay positive, and try to manage stress.    If you think you may be depressed, talk to your doctor. Depression should be treated.  When should I call my care team?  Call your " care team if:    You feel depressed.    You cannot complete basic daily activities.  Comments:  __________________________________________  __________________________________________  __________________________________________  __________________________________________  __________________________________________  __________________________________________  __________________________________________  For informational purposes only. Not to replace the advice of your health care provider.  Copyright   2006 [employer]. All rights reserved. [employer] 993895 - REV 03/16.  For informational purposes only. Not to replace the advice of your health care provider.  Copyright   2018 [employer]. All rights reserved.

## 2019-08-15 NOTE — PROGRESS NOTES
Subjective     Whitley Wesley is a 74 year old female who presents to clinic today for the following health issues:    HPI   Evaluation for Driving required by DMV. Patient's brother called the DMV to report her and she doesn't know why. She and her  are angry about this. She is their only  and drives short distances during the day. May have gotten lost the other day per chart notes.     Eye exam and doctor's note required for testing.     Hyperlipidemia Follow-Up      Are you having any of the following symptoms? (Select all that apply)  No complaints of shortness of breath, chest pain or pressure.  No increased sweating or nausea with activity.  No left-sided neck or arm pain.  No complaints of pain in calves when walking 1-2 blocks.    Are you regularly taking any medication or supplement to lower your cholesterol?   No    Are you having muscle aches or other side effects that you think could be caused by your cholesterol lowering medication?  No      Depression and Anxiety Follow-Up    How are you doing with your depression since your last visit? No change    How are you doing with your anxiety since your last visit?  No change    Are you having other symptoms that might be associated with depression or anxiety? No    Have you had a significant life event? No     Do you have any concerns with your use of alcohol or other drugs? No    Social History     Tobacco Use     Smoking status: Never Smoker     Smokeless tobacco: Never Used   Substance Use Topics     Alcohol use: No     Drug use: No     PHQ 7/26/2018 12/27/2018 6/6/2019   PHQ-9 Total Score 1 2 0   Q9: Thoughts of better off dead/self-harm past 2 weeks Not at all Not at all Not at all     STEFF-7 SCORE 12/27/2016 6/19/2017 7/26/2018   Total Score - - -   Total Score 0 0 0           Suicide Assessment Five-step Evaluation and Treatment (SAFE-T)    Patient Active Problem List   Diagnosis     Memory loss     CARDIOVASCULAR SCREENING; LDL GOAL LESS  "THAN 130     Advanced directives, counseling/discussion     Colon polyps     Cataracts, both eyes     Generalized anxiety disorder     Caregiver stress     Cervicalgia     Cervical spondylosis with myelopathy     DDD (degenerative disc disease), cervical     Hyperlipidemia LDL goal <160     Tension headache     Polymyalgia rheumatica (H)     Past Surgical History:   Procedure Laterality Date     ANKLE SURGERY Left 2007    Left ankle       Social History     Tobacco Use     Smoking status: Never Smoker     Smokeless tobacco: Never Used   Substance Use Topics     Alcohol use: No     Family History   Problem Relation Age of Onset     Cancer Mother         \"urinary\"     Cancer Brother         lung     Diabetes No family hx of      Hypertension No family hx of      Cerebrovascular Disease No family hx of      Thyroid Disease No family hx of      Glaucoma No family hx of      Macular Degeneration No family hx of          Current Outpatient Medications   Medication Sig Dispense Refill     acetaminophen (TYLENOL) 650 MG CR tablet Take 1 tablet (650 mg) by mouth every 8 hours as needed for mild pain or fever 90 tablet 3     albuterol (PROAIR HFA/PROVENTIL HFA/VENTOLIN HFA) 108 (90 Base) MCG/ACT inhaler Inhale 2 puffs into the lungs every 6 hours       Cholecalciferol (VITAMIN D3) 2000 units CAPS Take 1 capsule by mouth daily       Diclofenac Potassium 50 MG PACK        Allergies   Allergen Reactions     No Known Drug Allergies      Recent Labs   Lab Test 07/26/18  1123 06/19/17  0906 12/27/16  1635  03/04/14  1019 01/07/13  0845 08/08/12   * 112*  --   --   --  111  --    HDL 69 92  --   --   --  83  --    TRIG 108 78  --   --   --  73 107   ALT  --  31  --   --  35  --  17   CR 0.72 0.88 0.72   < > 0.67 0.80 0.79   GFRESTIMATED 79 63 80   < > 87 71 77   GFRESTBLACK >90 76 >90   GFR Calc     < > >90 86 89   POTASSIUM  --  4.3 3.9   < > 3.8 4.0 4   TSH 3.36 4.11*  --    < > 4.85 4.31 3.69    < > = " "values in this interval not displayed.      BP Readings from Last 3 Encounters:   08/15/19 117/74   06/06/19 132/74   05/01/19 130/73    Wt Readings from Last 3 Encounters:   08/15/19 58.1 kg (128 lb)   06/06/19 58.8 kg (129 lb 9.6 oz)   05/01/19 58.1 kg (128 lb)                      Reviewed and updated as needed this visit by Provider         Review of Systems   ROS COMP: Constitutional, HEENT, cardiovascular, pulmonary, gi and gu systems are negative, except as otherwise noted.      Objective    /74 (BP Location: Right arm, Patient Position: Chair, Cuff Size: Adult Regular)   Pulse 91   Temp 98.9  F (37.2  C) (Oral)   Resp 16   Ht 1.53 m (5' 0.25\")   Wt 58.1 kg (128 lb)   LMP  (LMP Unknown)   SpO2 95%   Breastfeeding? No   BMI 24.79 kg/m    Body mass index is 24.79 kg/m .  Physical Exam   GENERAL APPEARANCE: healthy, alert and no distress  EYES: Eyes grossly normal to inspection, PERRL and conjunctivae and sclerae normal  HENT: ear canals and TM's normal, nose and mouth without ulcers or lesions, oropharynx clear and oral mucous membranes moist  NECK: no adenopathy, no asymmetry, masses, or scars and thyroid normal to palpation  RESP: lungs clear to auscultation - no rales, rhonchi or wheezes  CV: regular rates and rhythm, normal S1 S2, no S3 or S4, no murmur, click or rub, no peripheral edema and peripheral pulses strong  ABDOMEN: soft, nontender, no hepatosplenomegaly, no masses and bowel sounds normal  MS: no musculoskeletal defects are noted and gait is age appropriate without ataxia  SKIN: no suspicious lesions or rashes  NEURO: Normal strength and tone, sensory exam grossly normal, mentation flat and repeats questions a lot compared to previous baseline.   PSYCH: mentation appears less animated than expected and tends to underplay any issues with her memory. Wants to have driving test done today and difficult to understand that this needs to be scheduled at another site.     Diagnostic Test " Results:  Labs reviewed in Epic  Results for orders placed or performed in visit on 08/15/19 (from the past 24 hour(s))   CBC with platelets   Result Value Ref Range    WBC 7.7 4.0 - 11.0 10e9/L    RBC Count 4.63 3.8 - 5.2 10e12/L    Hemoglobin 14.7 11.7 - 15.7 g/dL    Hematocrit 44.3 35.0 - 47.0 %    MCV 96 78 - 100 fl    MCH 31.7 26.5 - 33.0 pg    MCHC 33.2 31.5 - 36.5 g/dL    RDW 13.3 10.0 - 15.0 %    Platelet Count 266 150 - 450 10e9/L   Erythrocyte sedimentation rate auto   Result Value Ref Range    Sed Rate 8 0 - 30 mm/h   UA reflex to Microscopic and Culture   Result Value Ref Range    Color Urine Yellow     Appearance Urine Clear     Glucose Urine Negative NEG^Negative mg/dL    Bilirubin Urine Negative NEG^Negative    Ketones Urine Trace (A) NEG^Negative mg/dL    Specific Gravity Urine >1.030 1.003 - 1.035    Blood Urine Trace (A) NEG^Negative    pH Urine 5.0 5.0 - 7.0 pH    Protein Albumin Urine Negative NEG^Negative mg/dL    Urobilinogen Urine 0.2 0.2 - 1.0 EU/dL    Nitrite Urine Negative NEG^Negative    Leukocyte Esterase Urine Negative NEG^Negative    Source Midstream Urine    Urine Microscopic   Result Value Ref Range    WBC Urine 0 - 5 OTO5^0 - 5 /HPF    RBC Urine O - 2 OTO2^O - 2 /HPF    Squamous Epithelial /LPF Urine Many (A) FEW^Few /LPF    Bacteria Urine Moderate (A) NEG^Negative /HPF    Calcium Oxalate Moderate (A) NEG^Negative /HPF    Mucous Urine Present (A) NEG^Negative /LPF           Assessment & Plan       ICD-10-CM    1. Memory loss- recommend neurology evaluation, patient has declined in the past R41.3 Comprehensive metabolic panel     CBC with platelets     TSH with free T4 reflex     Folate     Vitamin B12     OCCUPATIONAL THERAPY REFERRAL     Urine Microscopic   2. Polymyalgia rheumatica (H)- symptoms resolved M35.3 Erythrocyte sedimentation rate auto     CRP inflammation   3. Hyperlipidemia LDL goal <160 E78.5 Stable    4. Urinary frequency R35.0 UA reflex to Microscopic and Culture    5. Screen for colon cancer Z12.11 Fecal colorectal cancer screen FIT - Future (S+30)   6. Generalized anxiety disorder F41.1 Not on medication any more   7. Caregiver stress Z63.6 Sole caregiver for 89 year old  in a wheel chair.           CONSULTATION/REFERRAL to [facility] for driving test.   FUTURE LABS:       - Schedule fasting labs in 3 months  FUTURE APPOINTMENTS:       - Follow-up visit in 3 months or sooner if any questions or concerns.   Regular exercise  See Patient Instructions    No follow-ups on file.    Mary Rolle MD  [facility]

## 2019-08-15 NOTE — LETTER
[address] [address]  [phone]        August 15, 2019    Regarding:  Whitley Wesley  [address]   [facility] [place] [facility] [address]              To Whom It May Concern;    I agree with a written and 's skill assessment. Patient is in good physical condition and should be able to complete the 's testing without problem.          Sincerely,        Mary Rolle MD

## 2019-08-17 NOTE — RESULT ENCOUNTER NOTE
Dear Whitley Wesley,    Your test results are attached. I am happy to let you know that they are stable and your medications can stay the same.    The blood tests all look great! We can recheck labs in 1 year.     Please call me if you have any questions about these test results or about your care.    Sincerely,    Mary Rolle MD

## 2019-08-22 ENCOUNTER — TELEPHONE (OUTPATIENT)
Dept: FAMILY MEDICINE | Facility: CLINIC | Age: 74
End: 2019-08-22

## 2019-08-22 NOTE — TELEPHONE ENCOUNTER
Called and spoke with patient, she states she received some forms and letters in the mail stating to check in with a . Wondering if provider knows anything about that. Told patient she was referred to [facility] for driving and written assessment test. Told patient to call the number on the letter to find out what that is about.    Maura Morejon MA

## 2019-08-22 NOTE — TELEPHONE ENCOUNTER
Reason for Call:  Other call back    Detailed comments: Pt has questions regarding driving assessment training  and other concerns that she  would like a call back to discuss further.    Phone Number Patient can be reached at: Home number on file [phone] (home)    Best Time: anytime    Can we leave a detailed message on this number? YES    Call taken on 8/22/2019 at 8:59 AM by Calvin Matos

## 2019-10-19 ENCOUNTER — NURSE TRIAGE (OUTPATIENT)
Dept: NURSING | Facility: CLINIC | Age: 74
End: 2019-10-19

## 2019-10-19 NOTE — TELEPHONE ENCOUNTER
"Dr Mcdaniel calling stating she had received a page to call patient regarding medication.   Dr Mcdaniel stating she had called patient and advised patient to contact clinic Monday. Dr Mcdaniel is requesting writer contact patient to see if triage is required for a \"headache.\" There is no documentation in Epic of page placed.     Placed call to patient male answered stating patient is shopping. Stating patient had a call and was advised to call clinic Monday. Advised if patient had further questions to call back.    Caller verbalized understanding. Denies further questions.    Kristy Hogue RN  [facility]      "

## 2019-12-06 ENCOUNTER — TELEPHONE (OUTPATIENT)
Dept: FAMILY MEDICINE | Facility: CLINIC | Age: 74
End: 2019-12-06

## 2019-12-06 ENCOUNTER — OFFICE VISIT (OUTPATIENT)
Dept: FAMILY MEDICINE | Facility: CLINIC | Age: 74
End: 2019-12-06
Payer: COMMERCIAL

## 2019-12-06 ENCOUNTER — PATIENT OUTREACH (OUTPATIENT)
Dept: FAMILY MEDICINE | Facility: CLINIC | Age: 74
End: 2019-12-06

## 2019-12-06 VITALS
RESPIRATION RATE: 16 BRPM | HEIGHT: 60 IN | SYSTOLIC BLOOD PRESSURE: 132 MMHG | HEART RATE: 82 BPM | OXYGEN SATURATION: 97 % | DIASTOLIC BLOOD PRESSURE: 79 MMHG | TEMPERATURE: 98.1 F | BODY MASS INDEX: 26.11 KG/M2 | WEIGHT: 133 LBS

## 2019-12-06 DIAGNOSIS — R41.3 MEMORY LOSS: Primary | ICD-10-CM

## 2019-12-06 DIAGNOSIS — R51.9 NONINTRACTABLE HEADACHE, UNSPECIFIED CHRONICITY PATTERN, UNSPECIFIED HEADACHE TYPE: ICD-10-CM

## 2019-12-06 PROCEDURE — 99214 OFFICE O/P EST MOD 30 MIN: CPT | Performed by: NURSE PRACTITIONER

## 2019-12-06 ASSESSMENT — ACTIVITIES OF DAILY LIVING (ADL): DEPENDENT_IADLS:: INDEPENDENT

## 2019-12-06 ASSESSMENT — MIFFLIN-ST. JEOR: SCORE: 1019.78

## 2019-12-06 ASSESSMENT — PAIN SCALES - GENERAL: PAINLEVEL: NO PAIN (0)

## 2019-12-06 NOTE — LETTER
[facility]  Complex Care Plan  About Me:    Patient Name:  Whitley Andrade    YOB: 1945  Age:         74 year old   Silvia MRN:    [id number] Telephone Information:  Home Phone [phone]   Mobile [phone]       Address:  [address]  [facility] [address] Email address:  No e-mail address on record      Emergency Contact(s)    Name Relationship Lgl Grd Work Phone Home Phone Mobile Phone   1. YANG ANDRADE Spouse No NONE [phone] NONE   2. TEE CALIX Son   [phone]    3. CEZAR TRIPP Sister   [phone]           Primary language:  English     needed? No   [facility]:  [phone] op. 1  Other communication barriers: Cognitive impairment  Preferred Method of Communication:  Mail  Current living arrangement: I live in a private home with spouse  Mobility Status/ Medical Equipment: Independent    Health Maintenance  Health Maintenance Reviewed: Due/Overdue   Health Maintenance Due   Topic Date Due     ZOSTER IMMUNIZATION (1 of 2) 02/17/1994     COLONOSCOPY  02/15/2018     ADVANCE CARE PLANNING  03/28/2019     MEDICARE ANNUAL WELLNESS VISIT  07/26/2019     INFLUENZA VACCINE (1) 09/01/2019     PHQ-9  12/06/2019        My Access Plan  Medical Emergency 911   Primary Clinic Line [facility] - [phone]   24 Hour Appointment Line [phone] or  [phone]) (toll-free)   24 Hour Nurse Line [phone] (toll-free)   Preferred Urgent Care [facility] [phone]   Preferred Hospital [facility]  [phone]   Preferred Pharmacy [id number] [place], [place] - [address]     Behavioral Health Crisis Line The National Suicide Prevention Lifeline at 1-626.755.5054 or 911             My Care Team Members  Patient Care Team       Relationship Specialty Notifications Start End    Mary Rolle MD PCP - General  Family Practice  10/8/13     Phone: [phone] Fax: [phone]         [address] [place] [facility] [address]    Mary Rolle MD Assigned PCP   8/11/13     Phone: [phone] Fax: [phone]         [address] [place] [address]    Behl, Melissa K, RN Lead Care Coordinator Primary Care - [facility] Admissions 12/6/19     Phone: [phone] Fax: [phone]                My Care Plans  Self Management and Treatment Plan  Goals and (Comments)  Goals        General    Medical (pt-stated)     Notes - Note created  12/6/2019  1:15 PM by Behl, Melissa K, RN    Goal Statement: I will schedule an appointment with a neurologist.  Measure of Success: Appointment with neurologist will be scheduled and attended.  Supportive Steps to Achieve: Family has scheduling phone numbers.  Barriers: addressed  Strengths: care coordination and family support  Date to Achieve By: 2/1/20  Patient expressed understanding of goal: yes                Action Plans on File:            Depression          Advance Care Plans/Directives Type:        My Medical and Care Information  Problem List   Patient Active Problem List   Diagnosis     Memory loss     CARDIOVASCULAR SCREENING; LDL GOAL LESS THAN 130     Advanced directives, counseling/discussion     Colon polyps     Cataracts, both eyes     Generalized anxiety disorder     Caregiver stress     Cervicalgia     Cervical spondylosis with myelopathy     DDD (degenerative disc disease), cervical     Hyperlipidemia LDL goal <160     Tension headache     Polymyalgia rheumatica (H)      Current Medications and Allergies:  See printed Medication Report.    Care Coordination Start Date: 12/6/2019   Frequency of Care Coordination: weekly   Form Last Updated: 12/06/2019
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
## 2019-12-06 NOTE — PATIENT INSTRUCTIONS
At [facility], we strive to deliver an exceptional experience to you, every time we see you. If you receive a survey, please complete it as we do value your feedback.  If you have MyChart, you can expect to receive results automatically within 24 hours of their completion.  Your provider will send a note interpreting your results as well.   If you do not have MyChart, you should receive your results in about a week by mail.    Your care team:                            Family Medicine Internal Medicine   MD Dmitry Wing MD Shantel Branch-Fleming, MD Katya Georgiev PA-C Megan Hill, APRN CNP    Michael Glover, MD Pediatrics   Misael Tenorio, PADAMIAN Grayson, MD Chiquis Romero APRN CNP   MD Sarai Sexton MD Deborah Mielke, MD Kim Thein, APRN CNP      Clinic hours: Monday - Thursday 7 am-7 pm; Fridays 7 am-5 pm.   Urgent care: Monday - Friday 11 am-9 pm; Saturday and Sunday 9 am-5 pm.  Pharmacy : Monday -Thursday 8 am-8 pm; Friday 8 am-6 pm; Saturday and Sunday 9 am-5 pm.     Clinic: [phone]   Pharmacy: [phone]        Patient Education     Coping with Memory Loss  What happens when you have memory loss?  Memory loss causes problems with thinking, learning and memory. You may feel forgetful or have trouble focusing on tasks.  Memory loss may be a side effect of medicine, chemotherapy or radiation. In these cases, problems with memory may improve after you finish your treatment. But you could have long-term problems.  Other factors that affect memory and your ability to focus include:    Aging    Illness    Depression    Hormonal changes    Anemia (low red blood cells)    Stress.  What can I do to treat or prevent memory loss?  Problems with thinking and memory can be very frustrating. There is no standard treatment at this time. But there are things you can do to reduce the effects of memory loss:    Really pay  "attention. Use your eyes, ears and sense of touch to remember things. Focus when someone tells you something.    Limit distractions when you need to complete tasks that require you to focus.    Tell yourself what you are doing as you do it. For example, if you're going out for a few hours, as you close the door tell yourself, \"I'm locking the door now and putting the key in my pocket so I don't have to worry about whether I locked the door.\"    Give yourself clear reminders. If you need to buy dog food, put the empty bag at the door so you'll see it as you leave the house. Or write yourself a note and put it where it's needed.    Keep things in the same place. This way you don't have to wonder where you put your keys, remote control or medicine.    Keep a journal of daily events and activities. Carry a notebook and write down important information that you want to remember.    Use helpful tools, such as:  ? A daily planner  ? A wall calendar  ? Checklists  ? Sticky notes  ? A  near the door  ? A pre-programmed phone  ? A wristwatch with an alarm  ? A pill box to keep track of your medicines.    Get plenty of sleep and exercise each day.    Stay positive, and try to manage stress.    If you think you may be depressed, talk to your doctor. Depression should be treated.  When should I call my care team?  Call your care team if:    You feel depressed.    You cannot complete basic daily activities.  Comments:  __________________________________________  __________________________________________  __________________________________________  __________________________________________  __________________________________________  __________________________________________  __________________________________________  For informational purposes only. Not to replace the advice of your health care provider.  Copyright   2006 [facility]. All rights reserved. [employer] 633998 - REV 03/16.  For " informational purposes only. Not to replace the advice of your health care provider.  Copyright   2018 [employer]. All rights reserved.

## 2019-12-06 NOTE — LETTER
[place] CARE COORDINATION  [address] [address]    December 6, 2019    Whitley Wesley  [address]   [facility] [address]      Dear Whitley,    I am a clinic care coordinator who works with Mary Rolle MD at [facility]. I wanted to thank you for spending the time to talk with me.  I wanted to introduce myself and provide you with my contact information so that you can call me with questions or concerns about your health care. Below is a description of clinic care coordination and how I can further assist you.     The clinic care coordinator is a registered nurse and/or  who understand the health care system. The goal of clinic care coordination is to help you manage your health and improve access to the [facility] system in the most efficient manner. The registered nurse can assist you in meeting your health care goals by providing education, coordinating services, and strengthening the communication among your providers. The  can assist you with financial, behavioral, psychosocial, chemical dependency, counseling, and/or psychiatric resources.    Please feel free to contact me at [phone], with any questions or concerns. We at [facility] are focused on providing you with the highest-quality healthcare experience possible and that all starts with you.     Sincerely,     Melissa Behl BSN, RN, PHN, [facility]  Primary Care Clinical RN Care Coordinator  [place] [phone]     Enclosed: I have enclosed a copy of the Complex Care Plan. This has helpful information and goals that we have talked about. Please keep this in an easy to access place to use as needed.

## 2019-12-06 NOTE — TELEPHONE ENCOUNTER
Detailed comments:  Pt's son dropped off Health Care Directive     HCD received on 12/6/2019 at 11:28 AM by Elizabeth Garcia and placed in Health Care Directive Box

## 2019-12-11 ENCOUNTER — DOCUMENTATION ONLY (OUTPATIENT)
Dept: OTHER | Facility: CLINIC | Age: 74
End: 2019-12-11

## 2019-12-13 ENCOUNTER — PATIENT OUTREACH (OUTPATIENT)
Dept: CARE COORDINATION | Facility: CLINIC | Age: 74
End: 2019-12-13

## 2019-12-13 NOTE — PROGRESS NOTES
Clinic Care Coordination Contact    Follow Up Progress Note      Assessment: RN CC spoke with patient's son Deep (consent to communicate on file).  Deep reports things are going well.  Future neurology appointment scheduled for 1/21/19.  No other concerns and this time.  Deep declines further care coordination services at this time stating he will outreach to writer if needs arise.    Goals addressed this encounter:   Goals Addressed                 This Visit's Progress      COMPLETED: Medical (pt-stated)        Goal Statement: I will schedule an appointment with a neurologist.  Measure of Success: Appointment with neurologist will be scheduled and attended.  Supportive Steps to Achieve: Family has scheduling phone numbers.  Barriers: addressed  Strengths: care coordination and family support  Date to Achieve By: 2/1/20  Patient expressed understanding of goal: yes                Intervention/Education provided during outreach: Plan of care reviewed.          Plan:   Patient will attend future neurology appointment 1/21/19.  Care Coordinator will do no further follow up calls per Deep's request, but will remain available in the event of future needs.    Melissa Behl BSN, RN, PHN, CCM  Primary Care Clinical RN Care Coordinator  [facility]   [phone]

## 2019-12-31 ENCOUNTER — TELEPHONE (OUTPATIENT)
Dept: FAMILY MEDICINE | Facility: CLINIC | Age: 74
End: 2019-12-31

## 2019-12-31 ENCOUNTER — MEDICAL CORRESPONDENCE (OUTPATIENT)
Dept: HEALTH INFORMATION MANAGEMENT | Facility: CLINIC | Age: 74
End: 2019-12-31

## 2019-12-31 NOTE — TELEPHONE ENCOUNTER
Reason for Call:  Form, our goal is to have forms completed with 72 hours, however, some forms may require a visit or additional information.    Type of letter, form or note:  physicician orders for admission into assisted living    Who is the form from?: Patient    Where did the form come from: Patient or family brought in       What clinic location was the form placed at?: [place] [facility]    Where the form was placed: first floor -- [facility] Box/Folder    What number is listed as a contact on the form?: [phone]       Additional comments: call marc Adame ([phone]) with any questions and when form has been faxed-- he would like to come  original copy when it has been completed and faxed.     Call taken on 12/31/2019 at 1:13 PM by Paula Caceres

## 2020-01-02 NOTE — TELEPHONE ENCOUNTER
Form is received from the  and forward to Dr. Rolle to address.  Jack Aceves,  For 1st Floor Primary Care ([facility])

## 2020-01-06 PROBLEM — M35.3 POLYMYALGIA RHEUMATICA (H): Status: ACTIVE | Noted: 2019-06-06

## 2020-01-06 NOTE — TELEPHONE ENCOUNTER
Form is faxed to [facility] at fax # [phone].  Jack Aceves,  For 1st Floor Primary Care ([facility])    Patient's form will be deliver to the  this afternoon for pickup after 4p.  Jack Aceves,  For [facility]    Please notify Deep to let them know the above info.  And remind the person who is picking up to bring their photo ID.  Jack Aceves,  For [facility]     NOTE: If patient and or guardians calls the clinic before the care teams gets a chance to call them, please notify the caller the above information regarding pickup times, remind them to bring a photo ID, document and close the encounter.  Jack Aceves,  For [facility]    FYI:  Anything completed after 2:00p will not be delivered until the next business day after 3p.   Jack Aceves,  for [facility].

## 2020-01-06 NOTE — TELEPHONE ENCOUNTER
Called, left msg for Deep the form has been faxed and the original is at the  for Deep to pickup.  Jack Aceves,  For 1st Floor Primary Care ([facility])

## 2020-01-14 ENCOUNTER — MEDICAL CORRESPONDENCE (OUTPATIENT)
Dept: HEALTH INFORMATION MANAGEMENT | Facility: CLINIC | Age: 75
End: 2020-01-14

## 2020-01-21 ENCOUNTER — OFFICE VISIT (OUTPATIENT)
Dept: NEUROLOGY | Facility: CLINIC | Age: 75
End: 2020-01-21
Attending: NURSE PRACTITIONER
Payer: COMMERCIAL

## 2020-01-21 VITALS
BODY MASS INDEX: 26.44 KG/M2 | TEMPERATURE: 97.6 F | HEART RATE: 105 BPM | RESPIRATION RATE: 18 BRPM | WEIGHT: 135.4 LBS | OXYGEN SATURATION: 97 %

## 2020-01-21 DIAGNOSIS — F02.80 LATE ONSET ALZHEIMER'S DISEASE WITHOUT BEHAVIORAL DISTURBANCE (H): Primary | ICD-10-CM

## 2020-01-21 DIAGNOSIS — G30.1 LATE ONSET ALZHEIMER'S DISEASE WITHOUT BEHAVIORAL DISTURBANCE (H): Primary | ICD-10-CM

## 2020-01-21 PROCEDURE — 99203 OFFICE O/P NEW LOW 30 MIN: CPT | Performed by: PSYCHIATRY & NEUROLOGY

## 2020-01-21 ASSESSMENT — PAIN SCALES - GENERAL: PAINLEVEL: MILD PAIN (2)

## 2020-01-21 NOTE — NURSING NOTE
Whitley Wesley's goals for this visit include:   Chief Complaint   Patient presents with     New Patient     Memory loss        She requests these members of her care team be copied on today's visit information: Yes    PCP: Mary Rolle    Referring Provider:  MIRTA Estes CNP  [address] [place] [address]    Pulse 105   Temp 97.6  F (36.4  C) (Oral)   Resp 18   Wt 61.4 kg (135 lb 6.4 oz)   LMP  (LMP Unknown)   SpO2 97%   BMI 26.44 kg/m      Do you need any medication refills at today's visit? No    Herman Lora, [facility]

## 2020-01-21 NOTE — LETTER
"    2020         RE: Whitley Wesley  [address]  [place] [address]        Dear Colleague,    Thank you for referring your patient, Whitley Wesley, to the [facility]. Please see a copy of my visit note below.    Visit Date:   2020      HISTORY OF PRESENT ILLNESS:  This patient is a 74-year-old right-handed woman seen for evaluation of memory problems.  She is here with her son, Deep.  He gives most of the history.  The patient has had memory problems probably going back 5 years or more.  She was  to Deep's stepfather who was quite a bit older.  They were living in [place].  They moved back to this area 7 years ago and the stepfather made comments that they came back because the patient was having \"mental problems.\"  The history is otherwise vague.  The   a couple of weeks ago.  He was in a wheelchair, but mentally alert.  She was quite impaired mentally but able to drive.  They worked things out on that basis until he had his final illness.  They were living in an apartment with other seniors but he was extremely jealous and would not let her associate with other people.  She had lost her 's license and failed to pass the driving test, so she has not been driving.  The car has been sold.  Recently she has moved into assisted living at the [facility].  Deep lives nearby in [place].  She does make her own breakfast.  She has demonstrated no problems working at the stove.  She takes her other meals in the dining room.  She does not exercise.  Her diet was marginal.  Deep reports they were eating, baked beans and hot dogs, but now her diet is a more healthy.  Her sleep has been okay.  She has had problems with headaches.  This has been going on for months or perhaps even years.  They are frontal type headaches.  She also complains of leg pain.  She has no issues with vision, hearing, speech or swallowing.  She has no problem with bowel or " bladder control.  She has not been very social.  She is not up on current events.  She does have repeated conversations with Deep and that has been the case for quite a while.  The patient's daughter had noted problems a year ago and Deep agrees that in the last year, there have been significant issues with her memory.      PAST MEDICAL HISTORY:  Largely noncontributory.  She does not have high blood pressure, diabetes, thyroid or asthma.  She does have an inhaler for a prescription but it is not clear if she uses it or what it is for.  She has not had pertinent surgery or trauma to the head or neck.      MEDICATIONS:  Reviewed.      SOCIAL HISTORY:  She does not drink or smoke.      SOCIAL HISTORY:  She is retired from payroll.  She has 2 children.      FAMILY HISTORY:  Not known.      PHYSICAL EXAMINATION:   GENERAL:  The patient is cooperative and in no distress.  She refused to have her blood pressure taken.   NECK:  There are no carotid bruits.   HEART:  Auscultation of the heart shows S1 and S2.   NEUROLOGIC:  The patient is alert, oriented and lucid.  She scored 15/30 on a bedside cognitive assessment.  She had problems with orientation to time and place.  She could not spell the word world backwards, except for 1 letter.  She could not recall any of 3 words at 3 minutes.  Her attempt at a clock face was completely unsatisfactory.   CRANIAL NERVES:  Shows full visual fields to confrontation.  Funduscopic exam shows sharp discs bilaterally.  Eye movements are complete and conjugate without nystagmus.  Pupils react to light.  Facial sensation is normal.  Face moves symmetrically.  Palate elevates in the midline.  Tongue protrudes in the midline.   MOTOR:  Evaluation shows no pronator drift, normal finger tapping, finger-nose-finger and heel-knee-shin.  Strength is preserved in the arms and legs.  Muscle stretch reflexes are low amplitude and symmetric.  Toes are downgoing.   SENSORY:  Exam shows preserved  vibration and temperature in the hands and reduced vibration in the toes.  She can walk in limited fashion around the room and her gait is unremarkable.  She has had extensive laboratory testing performed by Dr. Rolle.  A vitamin B12 level was 500, this was in August.  CRP and sed rate, both normal, TSH normal, folic acid level normal, CBC and metabolic panel unremarkable.      ASSESSMENT:  Dementia of the Alzheimer type, late onset and advanced.      DISCUSSION:  This patient is seen for evaluation of cognitive impairment.  She has dementia, most likely of the Alzheimer type.  It is advanced at this point.  I fully support the measures Deep has taken to get her into a more supportive and secure environment.  She may need a higher level of care than assisted living.  There have been issues with her personal hygiene.  I am going to obtain an MRI scan of the brain to rule out any structural lesion.  A trial of donepezil could be undertaken, but I doubt it would offer much in the way of improvement that would make a significant difference in her care level.  I will see her in followup in 6 weeks for reexamination.         MITCH TORRES MD             D: 2020   T: 2020   MT: NTS      Name:     KOJO ANDRADE   MRN:      [id number]        Account:      [id number]   :      1945           Visit Date:   2020      Document: [id number]       Again, thank you for allowing me to participate in the care of your patient.        Sincerely,        Mitch Torres MD

## 2020-01-22 NOTE — PROGRESS NOTES
" addendum: reviewed MRI findings with son Deep. She is now in [place].  F/u as sched.      Visit Date:   2020      HISTORY OF PRESENT ILLNESS:  This patient is a 74-year-old right-handed woman seen for evaluation of memory problems.  She is here with her son, Deep.  He gives most of the history.  The patient has had memory problems probably going back 5 years or more.  She was  to Deep's stepfather who was quite a bit older.  They were living in [place].  They moved back to this area 7 years ago and the stepfather made comments that they came back because the patient was having \"mental problems.\"  The history is otherwise vague.  The   a couple of weeks ago.  He was in a wheelchair, but mentally alert.  She was quite impaired mentally but able to drive.  They worked things out on that basis until he had his final illness.  They were living in an apartment with other seniors but he was extremely jealous and would not let her associate with other people.  She had lost her 's license and failed to pass the driving test, so she has not been driving.  The car has been sold.  Recently she has moved into assisted living at the [facility].  Deep lives nearby in [place].  She does make her own breakfast.  She has demonstrated no problems working at the stove.  She takes her other meals in the dining room.  She does not exercise.  Her diet was marginal.  Deep reports they were eating, baked beans and hot dogs, but now her diet is a more healthy.  Her sleep has been okay.  She has had problems with headaches.  This has been going on for months or perhaps even years.  They are frontal type headaches.  She also complains of leg pain.  She has no issues with vision, hearing, speech or swallowing.  She has no problem with bowel or bladder control.  She has not been very social.  She is not up on current events.  She does have repeated conversations with Deep and that has been the " case for quite a while.  The patient's daughter had noted problems a year ago and Deep agrees that in the last year, there have been significant issues with her memory.      PAST MEDICAL HISTORY:  Largely noncontributory.  She does not have high blood pressure, diabetes, thyroid or asthma.  She does have an inhaler for a prescription but it is not clear if she uses it or what it is for.  She has not had pertinent surgery or trauma to the head or neck.      MEDICATIONS:  Reviewed.      SOCIAL HISTORY:  She does not drink or smoke.      SOCIAL HISTORY:  She is retired from payroll.  She has 2 children.      FAMILY HISTORY:  Not known.      PHYSICAL EXAMINATION:   GENERAL:  The patient is cooperative and in no distress.  She refused to have her blood pressure taken.   NECK:  There are no carotid bruits.   HEART:  Auscultation of the heart shows S1 and S2.   NEUROLOGIC:  The patient is alert, oriented and lucid.  She scored 15/30 on a bedside cognitive assessment.  She had problems with orientation to time and place.  She could not spell the word world backwards, except for 1 letter.  She could not recall any of 3 words at 3 minutes.  Her attempt at a clock face was completely unsatisfactory.   CRANIAL NERVES:  Shows full visual fields to confrontation.  Funduscopic exam shows sharp discs bilaterally.  Eye movements are complete and conjugate without nystagmus.  Pupils react to light.  Facial sensation is normal.  Face moves symmetrically.  Palate elevates in the midline.  Tongue protrudes in the midline.   MOTOR:  Evaluation shows no pronator drift, normal finger tapping, finger-nose-finger and heel-knee-shin.  Strength is preserved in the arms and legs.  Muscle stretch reflexes are low amplitude and symmetric.  Toes are downgoing.   SENSORY:  Exam shows preserved vibration and temperature in the hands and reduced vibration in the toes.  She can walk in limited fashion around the room and her gait is unremarkable.   She has had extensive laboratory testing performed by Dr. Rolle.  A vitamin B12 level was 500, this was in August.  CRP and sed rate, both normal, TSH normal, folic acid level normal, CBC and metabolic panel unremarkable.      ASSESSMENT:  Dementia of the Alzheimer type, late onset and advanced.      DISCUSSION:  This patient is seen for evaluation of cognitive impairment.  She has dementia, most likely of the Alzheimer type.  It is advanced at this point.  I fully support the measures Deep has taken to get her into a more supportive and secure environment.  She may need a higher level of care than assisted living.  There have been issues with her personal hygiene.  I am going to obtain an MRI scan of the brain to rule out any structural lesion.  A trial of donepezil could be undertaken, but I doubt it would offer much in the way of improvement that would make a significant difference in her care level.  I will see her in followup in 6 weeks for reexamination.         MITCH TORRES MD             D: 2020   T: 2020   MT: RUDOLPH      Name:     KOJO ANDRADE   MRN:      [id number]        Account:      [id number]   :      1945           Visit Date:   2020      Document: [id number]

## 2020-01-23 ENCOUNTER — PATIENT OUTREACH (OUTPATIENT)
Dept: CARE COORDINATION | Facility: CLINIC | Age: 75
End: 2020-01-23

## 2020-01-23 NOTE — PROGRESS NOTES
Social Work Telephone Message Note  [facility]     Patient Name:  Whitley Wesley  /Age:  1945 (74 year old)    Referral Source: Dr Jung, Neurology  Reason for Referral:  Community resources     attempted to contacted patient's son, Deep via telephone on 2020.  Sw had received a consult following a neurology appointment regarding community resources and alternative housing options for increased services for Whitley.  Left a message providing writer contact information requesting a return call.   will await families return phone call and will provide assistance at that time.          BILLY David, [facility]    [facility]  [phone]  [email or web address]

## 2020-01-27 ENCOUNTER — PATIENT OUTREACH (OUTPATIENT)
Dept: CARE COORDINATION | Facility: CLINIC | Age: 75
End: 2020-01-27

## 2020-01-27 NOTE — PROGRESS NOTES
Social Work Telephone Message Note  [facility]     Patient Name:  Whitley Wesley  /Age:  1945 (74 year old)    Referral Source: Dr Jung - neurology  Reason for Referral:  Community resources     contacted Patient's son, Deep via telephone on 2020.  Whitley and Deep were seen last week in neurology and requested assistance in finding a memory care for Whitley.  Deep is currently providing cares for his mother.  Provided Deep with [employer] contact information and confirmed he had writer contact information.   will continue to follows and assist as needed.           BILLY David, [facility]    [facility]  [phone]  [email or web address]

## 2020-01-30 ENCOUNTER — ANCILLARY PROCEDURE (OUTPATIENT)
Dept: MRI IMAGING | Facility: CLINIC | Age: 75
End: 2020-01-30
Attending: PSYCHIATRY & NEUROLOGY
Payer: COMMERCIAL

## 2020-01-30 DIAGNOSIS — G30.1 LATE ONSET ALZHEIMER'S DISEASE WITHOUT BEHAVIORAL DISTURBANCE (H): ICD-10-CM

## 2020-01-30 DIAGNOSIS — F02.80 LATE ONSET ALZHEIMER'S DISEASE WITHOUT BEHAVIORAL DISTURBANCE (H): ICD-10-CM

## 2020-01-30 PROCEDURE — 70551 MRI BRAIN STEM W/O DYE: CPT | Performed by: RADIOLOGY

## 2020-02-27 ENCOUNTER — OFFICE VISIT (OUTPATIENT)
Dept: NEUROLOGY | Facility: CLINIC | Age: 75
End: 2020-02-27
Payer: COMMERCIAL

## 2020-02-27 VITALS
OXYGEN SATURATION: 100 % | HEIGHT: 60 IN | DIASTOLIC BLOOD PRESSURE: 80 MMHG | SYSTOLIC BLOOD PRESSURE: 138 MMHG | HEART RATE: 78 BPM | RESPIRATION RATE: 16 BRPM | WEIGHT: 135.1 LBS | BODY MASS INDEX: 26.52 KG/M2

## 2020-02-27 DIAGNOSIS — G44.209 TENSION HEADACHE: Primary | ICD-10-CM

## 2020-02-27 PROCEDURE — 99214 OFFICE O/P EST MOD 30 MIN: CPT | Performed by: PSYCHIATRY & NEUROLOGY

## 2020-02-27 RX ORDER — GABAPENTIN 300 MG/1
300 CAPSULE ORAL 2 TIMES DAILY
Qty: 60 CAPSULE | Refills: 1 | Status: SHIPPED | OUTPATIENT
Start: 2020-02-27 | End: 2022-02-14

## 2020-02-27 ASSESSMENT — MIFFLIN-ST. JEOR: SCORE: 1028.28

## 2020-02-27 NOTE — NURSING NOTE
"Whitley Wesley's goals for this visit include: Return  She requests these members of her care team be copied on today's visit information:     PCP: Mary Rolle    Referring Provider:  No referring provider defined for this encounter.    /80   Pulse 78   Resp 16   Ht 1.53 m (5' 0.25\")   Wt 61.3 kg (135 lb 1.6 oz)   LMP  (LMP Unknown)   SpO2 100%   BMI 26.17 kg/m      Do you need any medication refills at today's visit? No  "

## 2020-02-27 NOTE — PROGRESS NOTES
Visit Date:   02/27/2020      INTERVAL HISTORY:  This patient is seen in followup with issues of dementia.  She is here with her son, Deep.  I initially saw the patient about a month ago.  She has a somewhat complicated history.  She had been living with her  and they were each struggling with their own disabilities, but were able to get by together.  Then he passed away.  Her cognitive issues became much more obvious in his absence.  She has moved into assisted living.  She did have an MRI scan of the brain performed.  I reviewed the images with her.  There is age-related change and atrophy, and this is more advanced when compared to imaging from 2013.      She is in assisted living.  She participates to a degree in the activities.  She did play bingo yesterday.  She does not go to exercise class.  She does complain of headache.  She has a daily type headache.  It is mostly present on the top of her head.  She has no visual symptoms.  She does not have jaw aching or claudication.  She has had a headache for about 2 years at least.  She takes Tylenol every day for it.  She does not drink much in the way of caffeine.      PHYSICAL EXAMINATION:   GENERAL:  The patient is cooperative and in no distress.   VITAL SIGNS:  Her blood pressure is 138/80.   NEUROLOGIC:  Visual acuity 20/20 in the right eye and 20/25 in the left eye.  Funduscopic exam shows sharp discs bilaterally.      LABORATORY DATA:  Performed in August included a sed rate of 8 and a normal CRP.      ASSESSMENT:   1.  Dementia of the Alzheimer type.   2.  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      DISCUSSION:  The patient is seen with the above problem list.  With regard to her dementia, there is no good treatment for her condition.  I am reluctant to prescribe donepezil because of the risk of side effects.  Her neurologic status will be monitored.  I discussed the diagnosis with the patient  and her son.      With regard to the headache, there could be an element of medication overuse.  I am going to put her on low dose gabapentin 100 mg twice a day and try and get her off the daily use of Tylenol.  If she uses it a couple of times a week I think that is reasonable, but Deep is going to have to monitor her use of the Tylenol.  I will see her in followup in a couple of months for reexamination.      This was a 25-minute appointment, more than half of which was spent in counseling and coordination of care.         MICTH TORRES MD             D: 2020   T: 2020   MT:       Name:     KOJO ANDRADE   MRN:      [id number]        Account:      [id number]   :      1945           Visit Date:   2020      Document: [id number]

## 2020-02-27 NOTE — LETTER
2/27/2020         RE: Whitley Wesley  [address]  [place] [address]        Dear Colleague,    Thank you for referring your patient, Whitley Wesley, to the [facility]. Please see a copy of my visit note below.    Visit Date:   02/27/2020      INTERVAL HISTORY:  This patient is seen in followup with issues of dementia.  She is here with her son, Deep.  I initially saw the patient about a month ago.  She has a somewhat complicated history.  She had been living with her  and they were each struggling with their own disabilities, but were able to get by together.  Then he passed away.  Her cognitive issues became much more obvious in his absence.  She has moved into assisted living.  She did have an MRI scan of the brain performed.  I reviewed the images with her.  There is age-related change and atrophy, and this is more advanced when compared to imaging from 2013.      She is in assisted living.  She participates to a degree in the activities.  She did play bingo yesterday.  She does not go to exercise class.  She does complain of headache.  She has a daily type headache.  It is mostly present on the top of her head.  She has no visual symptoms.  She does not have jaw aching or claudication.  She has had a headache for about 2 years at least.  She takes Tylenol every day for it.  She does not drink much in the way of caffeine.      PHYSICAL EXAMINATION:   GENERAL:  The patient is cooperative and in no distress.   VITAL SIGNS:  Her blood pressure is 138/80.   NEUROLOGIC:  Visual acuity 20/20 in the right eye and 20/25 in the left eye.  Funduscopic exam shows sharp discs bilaterally.      LABORATORY DATA:  Performed in August included a sed rate of 8 and a normal CRP.      ASSESSMENT:   1.  Dementia of the Alzheimer type.   2.  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      DISCUSSION:  The patient is seen with the above problem  list.  With regard to her dementia, there is no good treatment for her condition.  I am reluctant to prescribe donepezil because of the risk of side effects.  Her neurologic status will be monitored.  I discussed the diagnosis with the patient and her son.      With regard to the headache, there could be an element of medication overuse.  I am going to put her on low dose gabapentin 100 mg twice a day and try and get her off the daily use of Tylenol.  If she uses it a couple of times a week I think that is reasonable, but Deep is going to have to monitor her use of the Tylenol.  I will see her in followup in a couple of months for reexamination.      This was a 25-minute appointment, more than half of which was spent in counseling and coordination of care.         MITCH JUNG MD             D: 2020   T: 2020   MT:       Name:     KOJO ANDRADE   MRN:      [id number]        Account:      [id number]   :      1945           Visit Date:   2020      Document: [id number]       Again, thank you for allowing me to participate in the care of your patient.        Sincerely,        Mitch Jung MD

## 2020-03-10 ENCOUNTER — DOCUMENTATION ONLY (OUTPATIENT)
Dept: OTHER | Facility: CLINIC | Age: 75
End: 2020-03-10

## 2020-04-28 ENCOUNTER — TELEPHONE (OUTPATIENT)
Dept: NEUROLOGY | Facility: CLINIC | Age: 75
End: 2020-04-28

## 2020-04-28 NOTE — TELEPHONE ENCOUNTER
I called son Deep to discuss changing the appt. to a video appt. Pt is in assisted living and not able to have any visitors for a video appt. Son would like to cancel appt at this time and will call back to ventura Betancourt LPN

## 2020-06-25 ENCOUNTER — MEDICAL CORRESPONDENCE (OUTPATIENT)
Dept: HEALTH INFORMATION MANAGEMENT | Facility: CLINIC | Age: 75
End: 2020-06-25

## 2020-08-04 ENCOUNTER — MEDICAL CORRESPONDENCE (OUTPATIENT)
Dept: HEALTH INFORMATION MANAGEMENT | Facility: CLINIC | Age: 75
End: 2020-08-04

## 2020-11-08 ENCOUNTER — NURSE TRIAGE (OUTPATIENT)
Dept: NURSING | Facility: CLINIC | Age: 75
End: 2020-11-08

## 2020-11-10 ENCOUNTER — TELEPHONE (OUTPATIENT)
Dept: FAMILY MEDICINE | Facility: CLINIC | Age: 75
End: 2020-11-10

## 2020-11-10 NOTE — TELEPHONE ENCOUNTER
[facility] assisted living form placed in Dr Rolle's red folder in provider's bin for completion.      Stephanie BENSON)(CRISTIN)

## 2020-11-10 NOTE — TELEPHONE ENCOUNTER
Siria faxed over transfer orders- to admit to skilled nursing    Can you sent them back with signature H&P and signed medication list    Thank you       Her fax is [phone]

## 2020-11-12 ENCOUNTER — MEDICAL CORRESPONDENCE (OUTPATIENT)
Dept: HEALTH INFORMATION MANAGEMENT | Facility: CLINIC | Age: 75
End: 2020-11-12

## 2020-11-12 NOTE — TELEPHONE ENCOUNTER
Received signed forms. Printed last office visit and faxed this, the signed medication list and the Physicians Orders form to [facility], [facility], [phone], right fax confirmed at 9:40. Copy of the form to  and abstracting.  Ev Verduzco [facility]  2nd Floor  Primary Care

## 2021-02-11 ENCOUNTER — MEDICAL CORRESPONDENCE (OUTPATIENT)
Dept: HEALTH INFORMATION MANAGEMENT | Facility: CLINIC | Age: 76
End: 2021-02-11

## 2021-06-03 ENCOUNTER — TELEPHONE (OUTPATIENT)
Dept: FAMILY MEDICINE | Facility: CLINIC | Age: 76
End: 2021-06-03

## 2021-06-03 NOTE — TELEPHONE ENCOUNTER
Calling with update:  On memory care unit - locked unit  On 5/31 3:30pm patient left the unit with 2 other residents as another resident managed to get locked door open.   Was redirected back to floor and no injuries noted    To provider FYI. Please sign/close encounter      Bijal CAMN, RN

## 2021-06-19 NOTE — TELEPHONE ENCOUNTER
Requested Prescriptions   Pending Prescriptions Disp Refills     ALPRAZolam (XANAX) 0.25 MG tablet [Pharmacy Med Name: ALPRAZOLAM 0.25 MG TABLET] 90 tablet      Sig: TAKE 1 TABLET BY MOUTH 3 TIMES DAILY AS NEEDED FOR ANXIETY    There is no refill protocol information for this order        Routing refill request to provider for review/approval because:  Drug not on the [facility] refill protocol     Alex Bowen RN, BSN           no

## 2021-08-27 ENCOUNTER — TELEPHONE (OUTPATIENT)
Dept: FAMILY MEDICINE | Facility: CLINIC | Age: 76
End: 2021-08-27

## 2021-08-27 NOTE — TELEPHONE ENCOUNTER
Form needs office visit for completion. Patient has dementia. Need to call [facility] living to have them schedule or find out if Whitley has transferred care.   Mary Rolle MD

## 2021-08-30 NOTE — TELEPHONE ENCOUNTER
Called and spoke to FRANCISCO J Melgoza at [facility]. She confirmed that the patient is still there and she said to contact the son, Deep,  to schedule a Physical and understands that the form is pending until Physical appt.  Called and left a voicemail message to return our call to schedule an appt to complete a form. Holding onto form.  Ev Verduzco MA  [facility]  2nd Floor  Primary Care

## 2021-08-31 NOTE — TELEPHONE ENCOUNTER
Called and left a 2nd voicemail message for Deep to schedule patient's appt.  Ev Verduzco MA  [facility]  2nd Floor  Primary Care

## 2021-09-02 NOTE — TELEPHONE ENCOUNTER
No appointment made. Holding on to forms for 30 days under today's date.  Ev Verduzco [facility]  2nd Floor  Primary Care

## 2021-10-05 NOTE — PROGRESS NOTES
"  SUBJECTIVE:   Whitley Wesley is a 76 year old female who presents for Preventive Visit.      Patient has been advised of split billing requirements and indicates understanding:    Are you in the first 12 months of your Medicare coverage?  No    Healthy Habits:     In general, how would you rate your overall health?  Good    Frequency of exercise:  None    Do you usually eat at least 4 servings of fruit and vegetables a day, include whole grains    & fiber and avoid regularly eating high fat or \"junk\" foods?  Yes    Taking medications regularly:  Yes    Barriers to taking medications:  None    Medication side effects:  None    Ability to successfully perform activities of daily living:  No assistance needed    Home Safety:  No safety concerns identified    Hearing Impairment:  No hearing concerns    In the past 6 months, have you been bothered by leaking of urine?  No    In general, how would you rate your overall mental or emotional health?  Good      PHQ-2 Total Score: 0    Additional concerns today:  No    Do you feel safe in your environment? Yes    Have you ever done Advance Care Planning? (For example, a Health Directive, POLST, or a discussion with a medical provider or your loved ones about your wishes): Yes, advance care planning is on file.       Fall risk  Fallen 2 or more times in the past year?: (P) No  Any fall with injury in the past year?: (P) No  click delete button to remove this line now  Cognitive Screening Not appropriate due to known dementia. Evaluated by neurology 2 years ago and told that no follow up needed. Gradual decline in memory. Recognizes family members. Transferred to memory care unit last year.     Do you have sleep apnea, excessive snoring or daytime drowsiness?: no    Reviewed and updated as needed this visit by clinical staff                 Reviewed and updated as needed this visit by Provider                Social History     Tobacco Use     Smoking status: Never Smoker     " Smokeless tobacco: Never Used   Substance Use Topics     Alcohol use: No               Hyperlipidemia Follow-Up      Are you regularly taking any medication or supplement to lower your cholesterol?   No    Are you having muscle aches or other side effects that you think could be caused by your cholesterol lowering medication?  No    Anxiety Follow-Up    How are you doing with your anxiety since your last visit? No change, living in memory care now with support from son and daughter.  passed away 2 years ago. He may have been the source of some of her anxiety.     Are you having other symptoms that might be associated with anxiety? No    Have you had a significant life event? Health Concerns     Are you feeling depressed? No    Do you have any concerns with your use of alcohol or other drugs? No    Social History     Tobacco Use     Smoking status: Never Smoker     Smokeless tobacco: Never Used   Substance Use Topics     Alcohol use: No     Drug use: No     STEFF-7 SCORE 12/27/2016 6/19/2017 7/26/2018   Total Score - - -   Total Score 0 0 0     PHQ 7/26/2018 12/27/2018 6/6/2019   PHQ-9 Total Score 1 2 0   Q9: Thoughts of better off dead/self-harm past 2 weeks Not at all Not at all Not at all           Current providers sharing in care for this patient include:   Patient Care Team:  Mary Rolle MD as PCP - General (Family Practice)  Mary Rolle MD as Assigned PCP    The following health maintenance items are reviewed in Epic and correct as of today:  Health Maintenance Due   Topic Date Due     ANNUAL REVIEW OF HM ORDERS  Never done     HEPATITIS C SCREENING  Never done     ZOSTER IMMUNIZATION (1 of 2) Never done     COLORECTAL CANCER SCREENING  07/28/2018     MEDICARE ANNUAL WELLNESS VISIT  07/26/2019     EYE EXAM  07/22/2020     FALL RISK ASSESSMENT  12/06/2020     PHQ-2  01/01/2021     INFLUENZA VACCINE (1) 09/01/2021     Lab work is in process  Labs reviewed in EPIC  BP Readings from Last 3  "Encounters:   10/08/21 137/80   02/27/20 138/80   12/06/19 132/79    Wt Readings from Last 3 Encounters:   10/08/21 85.3 kg (188 lb)   02/27/20 61.3 kg (135 lb 1.6 oz)   01/21/20 61.4 kg (135 lb 6.4 oz)                  Patient Active Problem List   Diagnosis     Memory loss     CARDIOVASCULAR SCREENING; LDL GOAL LESS THAN 130     Colon polyps     Cataracts, both eyes     Generalized anxiety disorder     Caregiver stress     Cervicalgia     Cervical spondylosis with myelopathy     DDD (degenerative disc disease), cervical     Hyperlipidemia LDL goal <160     Tension headache     Polymyalgia rheumatica (H)     Morbid obesity (H)     Dependent edema     Chronic congestive heart failure, unspecified heart failure type (H)     Past Surgical History:   Procedure Laterality Date     ANKLE SURGERY Left 2007    Left ankle       Social History     Tobacco Use     Smoking status: Never Smoker     Smokeless tobacco: Never Used   Substance Use Topics     Alcohol use: No     Family History   Problem Relation Age of Onset     Cancer Mother         \"urinary\"     Cancer Brother         lung     Diabetes No family hx of      Hypertension No family hx of      Cerebrovascular Disease No family hx of      Thyroid Disease No family hx of      Glaucoma No family hx of      Macular Degeneration No family hx of          Current Outpatient Medications   Medication Sig Dispense Refill     furosemide (LASIX) 20 MG tablet Take 1 tablet (20 mg) by mouth daily 30 tablet 3     acetaminophen (TYLENOL) 650 MG CR tablet Take 1 tablet (650 mg) by mouth every 8 hours as needed for mild pain or fever 90 tablet 3     albuterol (PROAIR HFA/PROVENTIL HFA/VENTOLIN HFA) 108 (90 Base) MCG/ACT inhaler Inhale 2 puffs into the lungs every 6 hours       Cholecalciferol (VITAMIN D3) 2000 units CAPS Take 1 capsule by mouth daily       Diclofenac Potassium 50 MG PACK        gabapentin (NEURONTIN) 300 MG capsule Take 1 capsule (300 mg) by mouth 2 times daily 60 " "capsule 1     Allergies   Allergen Reactions     No Known Drug Allergies      Recent Labs   Lab Test 08/15/19  0837 07/26/18  1123 06/19/17  0906 06/19/17  0906 03/17/15  1036 03/04/14  1019   LDL  --  101*  --  112*  --   --    HDL  --  69  --  92  --   --    TRIG  --  108  --  78  --   --    ALT 22  --   --  31  --  35   CR 0.66 0.72   < > 0.88   < > 0.67   GFRESTIMATED 86 79   < > 63   < > 87   GFRESTBLACK >90 >90   < > 76   < > >90   POTASSIUM 3.6  --   --  4.3   < > 3.8   TSH 2.33 3.36   < > 4.11*   < > 4.85    < > = values in this interval not displayed.      Pneumonia Vaccine:Adults age 65+ who received Pneumovax (PPSV23) at 65 years or older: Should be given PCV13 > 1 year after their most recent PPSV23  Mammogram Screening: Mammogram Screening - Patient over age 75, has elected to discontinue screenings.      Pertinent mammograms are reviewed under the imaging tab.    Review of Systems   Cardiovascular: Positive for peripheral edema.   Breasts:  Negative for tenderness, breast mass and discharge.   Genitourinary: Negative for pelvic pain, vaginal bleeding and vaginal discharge.     Constitutional, HEENT, cardiovascular, pulmonary, gi and gu systems are negative, except as otherwise noted.    OBJECTIVE:   LMP  (LMP Unknown)  Estimated body mass index is 26.17 kg/m  as calculated from the following:    Height as of 2/27/20: 1.53 m (5' 0.25\").    Weight as of 2/27/20: 61.3 kg (135 lb 1.6 oz).  Physical Exam  GENERAL: healthy, alert, well nourished, well hydrated, no distress, obese  HENT: ear canals- normal; TMs- normal; Nose- normal; Mouth- no ulcers, no lesions, throat is clear with no erythema or exudate.   NECK: no tenderness, no adenopathy, no asymmetry, no masses, no stiffness; thyroid- normal to palpation  RESP: lungs clear to auscultation - no rales, no rhonchi, no wheezes  CV: regular rates and rhythm, normal S1 S2, no S3 or S4 and no murmur, no click or rub -  ABDOMEN: soft, no tenderness, no  " hepatosplenomegaly, no masses, normal bowel sounds  MS: extremities- no gross deformities noted, no edema  SKIN: no suspicious lesions, no rashes  NEURO: strength and tone- normal, sensory exam- grossly normal, mentation- intact, speech- normal, reflexes- symmetric  BACK: no CVA tenderness, no paralumbar tenderness  PSYCH: Alert and oriented times 3; speech- coherent , normal rate and volume; able to articulate logical thoughts, able to abstract reason, no tangential thoughts, no hallucinations or delusions, affect- normal     Diagnostic Test Results:  Labs reviewed in Epic  Results for orders placed or performed in visit on 10/08/21 (from the past 24 hour(s))   CBC with platelets   Result Value Ref Range    WBC Count 8.2 4.0 - 11.0 10e3/uL    RBC Count 4.47 3.80 - 5.20 10e6/uL    Hemoglobin 13.6 11.7 - 15.7 g/dL    Hematocrit 42.3 35.0 - 47.0 %    MCV 95 78 - 100 fL    MCH 30.4 26.5 - 33.0 pg    MCHC 32.2 31.5 - 36.5 g/dL    RDW 13.0 10.0 - 15.0 %    Platelet Count 273 150 - 450 10e3/uL       ASSESSMENT / PLAN:       ICD-10-CM    1. Routine general medical examination at a health care facility  Z00.00    2. Need for hepatitis C screening test  Z11.59 declined   3. Screen for colon cancer  Z12.11 Declined due to age and health condition   4. Late onset Alzheimer's dementia without behavioral disturbance (H)  G30.1 TSH with free T4 reflex    F02.80 TSH with free T4 reflex   5. Morbid obesity (H)  E66.01 Weight loss counseling done, will address smaller portions with assisted living.    6. Dependent edema - recommend support stockings with 15-20 pound pressure, lasix for diuresis.  R60.9 furosemide (LASIX) 20 MG tablet   7. Hyperlipidemia LDL goal <160  E78.5 Lipid panel reflex to direct LDL Non-fasting     Lipid panel reflex to direct LDL Non-fasting   8. Chronic congestive heart failure, unspecified heart failure type (H) - suspected diagnosis and will verify. May need echocardiogram if symptoms persist. I50.9 CBC  "with platelets     Comprehensive metabolic panel     Magnesium     BNP-N terminal pro     CBC with platelets     Comprehensive metabolic panel     Magnesium     BNP-N terminal pro   9. Memory loss  R41.3 Progressive gradual memory loss. In appropriate situation for safety and care.        Patient has been advised of split billing requirements and indicates understanding: No  COUNSELING:  Reviewed preventive health counseling, as reflected in patient instructions       Regular exercise       Healthy diet/nutrition       Dental care       Bladder control       Fall risk prevention       Advanced Planning     Estimated body mass index is 26.17 kg/m  as calculated from the following:    Height as of 2/27/20: 1.53 m (5' 0.25\").    Weight as of 2/27/20: 61.3 kg (135 lb 1.6 oz).    Weight management plan: Discussed healthy diet and exercise guidelines    She reports that she has never smoked. She has never used smokeless tobacco.      Appropriate preventive services were discussed with this patient, including applicable screening as appropriate for cardiovascular disease, diabetes, osteopenia/osteoporosis, and glaucoma.  As appropriate for age/gender, discussed screening for colorectal cancer, prostate cancer, breast cancer, and cervical cancer. Checklist reviewing preventive services available has been given to the patient.    Reviewed patients plan of care and provided an AVS. The Basic Care Plan (routine screening as documented in Health Maintenance) for Whitley meets the Care Plan requirement. This Care Plan has been established and reviewed with the Patient and son.    Counseling Resources:  ATP IV Guidelines  Pooled Cohorts Equation Calculator  Breast Cancer Risk Calculator  Breast Cancer: Medication to Reduce Risk  FRAX Risk Assessment  ICSI Preventive Guidelines  Dietary Guidelines for Americans, 2010  USDA's MyPlate  ASA Prophylaxis  Lung CA Screening    Mary Rolle MD  [facility] " DM    Identified Health Risks:

## 2021-10-05 NOTE — PATIENT INSTRUCTIONS
Preventive Health Recommendations    See your health care provider every year to    Review health changes.     Discuss preventive care.      Review your medicines if your doctor has prescribed any.      You no longer need a yearly Pap test unless you've had an abnormal Pap test in the past 10 years. If you have vaginal symptoms, such as bleeding or discharge, be sure to talk with your provider about a Pap test.      Every 1 to 2 years, have a mammogram.  If you are over 69, talk with your health care provider about whether or not you want to continue having screening mammograms.      Every 10 years, have a colonoscopy. Or, have a yearly FIT test (stool test). These exams will check for colon cancer.       Have a cholesterol test every 5 years, or more often if your doctor advises it.       Have a diabetes test (fasting glucose) every three years. If you are at risk for diabetes, you should have this test more often.       At age 65, have a bone density scan (DEXA) to check for osteoporosis (brittle bone disease).    Shots:    Get a flu shot each year.    Get a tetanus shot every 10 years.    Talk to your doctor about your pneumonia vaccines. There are now two you should receive - Pneumovax (PPSV 23) and Prevnar (PCV 13).    Talk to your pharmacist about the shingles vaccine.    Talk to your doctor about the hepatitis B vaccine.    Nutrition:     Eat at least 5 servings of fruits and vegetables each day.      Eat whole-grain bread, whole-wheat pasta and brown rice instead of white grains and rice.      Get adequate about Calcium and Vitamin D.     Lifestyle    Exercise at least 150 minutes a week (30 minutes a day, 5 days a week). This will help you control your weight and prevent disease.      Limit alcohol to one drink per day.      No smoking.       Wear sunscreen to prevent skin cancer.       See your dentist twice a year for an exam and cleaning.      See your eye doctor every 1 to 2 years to screen for  conditions such as glaucoma, macular degeneration, cataracts, etc.    Personalized Prevention Plan  You are due for the preventive services outlined below.  Your care team is available to assist you in scheduling these services.  If you have already completed any of these items, please share that information with your care team to update in your medical record.    Health Maintenance Due   Topic Date Due     ANNUAL REVIEW OF HM ORDERS  Never done     Hepatitis C Screening  Never done     Zoster (Shingles) Vaccine (1 of 2) Never done     Colorectal Cancer Screening  07/28/2018     Eye Exam  07/22/2020     FALL RISK ASSESSMENT  12/06/2020     PHQ-2  01/01/2021     Flu Vaccine (1) 09/01/2021   At [facility], we strive to deliver an exceptional experience to you, every time we see you. If you receive a survey, please complete it as we do value your feedback.  If you have MyChart, you can expect to receive results automatically within 24 hours of their completion.  Your provider will send a note interpreting your results as well.   If you do not have MyChart, you should receive your results in about a week by mail.    Your care team:                            Family Medicine Internal Medicine   MD Dmitry Wing MD Shantel Branch-Fleming, MD Srinivasa Vaka, MD Katya Belousova, PADAMIAN Ngo, MIRTA Glover MD Pediatrics   YI Cullen, MD Chiquis Romero APRN CNP   MD Sarai Sexton MD Deborah Mielke, MD Kim Thein, APRN [employer]      Clinic hours: Monday - Thursday 7 am-6 pm; Fridays 7 am-5 pm.   Urgent care: Monday - Friday 10 am- 8 pm; Saturday and Sunday 9 am-5 pm.    Clinic: [phone]       [facility]: Monday - Thursday 8 am - 7 pm; Friday 8 am - 6 pm  [facility]: [phone]     Use www.[email or web address].org for 24/7 diagnosis and  treatment of dozens of conditions.    Patient Education     Leg Swelling in Both Legs    Swelling of the feet, ankles, and legs is called edema. It is caused by excess fluid that has collected in the tissues. Extra fluid in the body settles in the lowest part because of gravity. This is why the legs and feet are most affected.  Some of the causes for edema include:    Disease of the heart such as congestive heart failure    Standing or sitting for long periods of time    Infection of the feet or legs    Blood pooling in the veins of your legs (venous insufficiency) when the veins have less elasticity    Dilated veins in your lower leg (varicose veins)    Stockings or other clothing that is tight on your legs. This will cause blood to pool in your legs because the clothing limits blood flow.    Some medicines. These include some hormones such as birth control pills, some blood pressure medicines such as calcium channel blockers, steroids, and some antidepressants such as MAO inhibitors and tricyclics.    Menstrual periods that cause you to retain fluids    Many types of kidney disease    Liver failure or cirrhosis    Pregnancy. Some swelling is normal, but a sudden increase in leg swelling or weight gain can be a sign of a dangerous complication of pregnancy called eclampsia.    Poor nutrition    Thyroid disease  Treatment will depend on what is causing the swelling in your legs. Your healthcare provider may prescribe water pills (diuretics) to get rid of the extra fluid.  Home care  Follow these guidelines when caring for yourself at home:    Don't wear clothing such as stockings that are tight on your legs.    Keep your legs up while lying or sitting.    If infection, injury, or recent surgery is causing the swelling, stay off your legs as much as possible until symptoms get better.    If your healthcare provider says that your leg swelling is caused by venous insufficiency or varicose veins, don't sit or   one place for long periods of time. Take breaks and walk about every few hours. Brisk walking is a good exercise. It helps circulate the blood that has collected in your leg. Talk with your provider about using support stockings to stop daytime leg swelling.    If your provider says that heart disease is causing your leg swelling, follow a low-salt diet to stop extra fluid from staying in your body. You may also need medicine.  Follow-up care  Follow up with your healthcare provider, or as advised.  When to seek medical advice  Call your healthcare provider right away if any of these occur:    Swelling in both legs or ankles that gets worse    Swelling of the abdomen    Redness, warmth, or swelling in one leg    Fever of 100.4 F (38 C) or higher , or as directed by your healthcare provider    Yellow color to your skin or eyes    Rapid, unexplained weight gain    Having to sleep upright or use an increased number of pillow     Call 911  This is the fastest and safest way to get to the emergency department. The paramedics can also start treatment on the way to the hospital, if needed.  Call 911, or seekmedical attention right away if    You have new shortness of breath or chest pain    Worsening shortness of breath or chest pain?  [employer] last reviewed this educational content on 11/1/2019 2000-2021 [employer]. All rights reserved. This information is not intended as a substitute for professional medical care. Always follow your healthcare professional's instructions.

## 2021-10-06 ENCOUNTER — TELEPHONE (OUTPATIENT)
Dept: FAMILY MEDICINE | Facility: CLINIC | Age: 76
End: 2021-10-06

## 2021-10-06 NOTE — TELEPHONE ENCOUNTER
Abad MARX pt has plus 3 edema on both legs with water blisters in several places.  Pt only on Tylenol daily.  Normal weight is in low 170's lb, In aug she was in 180's. And today weight today is 190.8 lb's   No sob or chest congestion or cough.  Her blood pressure is 140/90.  Last seen by provider is 2020.  Abad MARX advised provider is out of clinic until tomorrow.  She said she is ok to wait for response. Does provider want to see pt or can they have order for compression stockings and diuretic?    Call her back at [phone] ok to leave detailed message.  Niecy CAMN, RN

## 2021-10-07 NOTE — TELEPHONE ENCOUNTER
Patient is scheduled to see me tomorrow. Please make sure that she knows about this appointment and can make it in for her visit. Will address tomorrow. If any respiratory distress, should go to emergency department or urgent care. Mary Rolle MD

## 2021-10-07 NOTE — TELEPHONE ENCOUNTER
Pt's son (CTC) is aware of appointment, and will pass along information to make sure care center knows as well.    Confirmed phone numbers with him so we can contact pt in future at her care center.    Reiterated if urgent or emergent symptoms to seek urgent or emergency care.    Thao Lucia,   [facility]

## 2021-10-08 ENCOUNTER — OFFICE VISIT (OUTPATIENT)
Dept: FAMILY MEDICINE | Facility: CLINIC | Age: 76
End: 2021-10-08
Payer: COMMERCIAL

## 2021-10-08 VITALS
OXYGEN SATURATION: 96 % | TEMPERATURE: 98.7 F | HEART RATE: 77 BPM | BODY MASS INDEX: 36.91 KG/M2 | SYSTOLIC BLOOD PRESSURE: 137 MMHG | HEIGHT: 60 IN | DIASTOLIC BLOOD PRESSURE: 80 MMHG | RESPIRATION RATE: 20 BRPM | WEIGHT: 188 LBS

## 2021-10-08 DIAGNOSIS — R41.3 MEMORY LOSS: ICD-10-CM

## 2021-10-08 DIAGNOSIS — Z00.00 ROUTINE GENERAL MEDICAL EXAMINATION AT A HEALTH CARE FACILITY: Primary | ICD-10-CM

## 2021-10-08 DIAGNOSIS — Z12.11 SCREEN FOR COLON CANCER: ICD-10-CM

## 2021-10-08 DIAGNOSIS — I50.9 CHRONIC CONGESTIVE HEART FAILURE, UNSPECIFIED HEART FAILURE TYPE (H): ICD-10-CM

## 2021-10-08 DIAGNOSIS — F02.80 LATE ONSET ALZHEIMER'S DEMENTIA WITHOUT BEHAVIORAL DISTURBANCE (H): ICD-10-CM

## 2021-10-08 DIAGNOSIS — E66.01 MORBID OBESITY (H): ICD-10-CM

## 2021-10-08 DIAGNOSIS — G30.1 LATE ONSET ALZHEIMER'S DEMENTIA WITHOUT BEHAVIORAL DISTURBANCE (H): ICD-10-CM

## 2021-10-08 DIAGNOSIS — Z11.59 NEED FOR HEPATITIS C SCREENING TEST: ICD-10-CM

## 2021-10-08 DIAGNOSIS — E78.5 HYPERLIPIDEMIA LDL GOAL <160: ICD-10-CM

## 2021-10-08 DIAGNOSIS — R60.9 DEPENDENT EDEMA: ICD-10-CM

## 2021-10-08 LAB
ALBUMIN SERPL-MCNC: 3.5 G/DL (ref 3.4–5)
ALP SERPL-CCNC: 85 U/L (ref 40–150)
ALT SERPL W P-5'-P-CCNC: 31 U/L (ref 0–50)
ANION GAP SERPL CALCULATED.3IONS-SCNC: 6 MMOL/L (ref 3–14)
AST SERPL W P-5'-P-CCNC: 20 U/L (ref 0–45)
BILIRUB SERPL-MCNC: 0.5 MG/DL (ref 0.2–1.3)
BUN SERPL-MCNC: 16 MG/DL (ref 7–30)
CALCIUM SERPL-MCNC: 8.8 MG/DL (ref 8.5–10.1)
CHLORIDE BLD-SCNC: 108 MMOL/L (ref 94–109)
CHOLEST SERPL-MCNC: 166 MG/DL
CO2 SERPL-SCNC: 28 MMOL/L (ref 20–32)
CREAT SERPL-MCNC: 0.72 MG/DL (ref 0.52–1.04)
ERYTHROCYTE [DISTWIDTH] IN BLOOD BY AUTOMATED COUNT: 13 % (ref 10–15)
FASTING STATUS PATIENT QL REPORTED: NO
GFR SERPL CREATININE-BSD FRML MDRD: 81 ML/MIN/1.73M2
GLUCOSE BLD-MCNC: 99 MG/DL (ref 70–99)
HCT VFR BLD AUTO: 42.3 % (ref 35–47)
HDLC SERPL-MCNC: 58 MG/DL
HGB BLD-MCNC: 13.6 G/DL (ref 11.7–15.7)
LDLC SERPL CALC-MCNC: 74 MG/DL
MAGNESIUM SERPL-MCNC: 2.4 MG/DL (ref 1.6–2.3)
MCH RBC QN AUTO: 30.4 PG (ref 26.5–33)
MCHC RBC AUTO-ENTMCNC: 32.2 G/DL (ref 31.5–36.5)
MCV RBC AUTO: 95 FL (ref 78–100)
NONHDLC SERPL-MCNC: 108 MG/DL
NT-PROBNP SERPL-MCNC: 334 PG/ML (ref 0–450)
PLATELET # BLD AUTO: 273 10E3/UL (ref 150–450)
POTASSIUM BLD-SCNC: 3.9 MMOL/L (ref 3.4–5.3)
PROT SERPL-MCNC: 6.8 G/DL (ref 6.8–8.8)
RBC # BLD AUTO: 4.47 10E6/UL (ref 3.8–5.2)
SODIUM SERPL-SCNC: 142 MMOL/L (ref 133–144)
TRIGL SERPL-MCNC: 168 MG/DL
TSH SERPL DL<=0.005 MIU/L-ACNC: 3.01 MU/L (ref 0.4–4)
WBC # BLD AUTO: 8.2 10E3/UL (ref 4–11)

## 2021-10-08 PROCEDURE — 84443 ASSAY THYROID STIM HORMONE: CPT | Performed by: FAMILY MEDICINE

## 2021-10-08 PROCEDURE — 80061 LIPID PANEL: CPT | Performed by: FAMILY MEDICINE

## 2021-10-08 PROCEDURE — 99397 PER PM REEVAL EST PAT 65+ YR: CPT | Mod: 25 | Performed by: FAMILY MEDICINE

## 2021-10-08 PROCEDURE — 80053 COMPREHEN METABOLIC PANEL: CPT | Performed by: FAMILY MEDICINE

## 2021-10-08 PROCEDURE — 83735 ASSAY OF MAGNESIUM: CPT | Performed by: FAMILY MEDICINE

## 2021-10-08 PROCEDURE — G0008 ADMIN INFLUENZA VIRUS VAC: HCPCS | Performed by: FAMILY MEDICINE

## 2021-10-08 PROCEDURE — 83880 ASSAY OF NATRIURETIC PEPTIDE: CPT | Performed by: FAMILY MEDICINE

## 2021-10-08 PROCEDURE — 90662 IIV NO PRSV INCREASED AG IM: CPT | Performed by: FAMILY MEDICINE

## 2021-10-08 PROCEDURE — 36415 COLL VENOUS BLD VENIPUNCTURE: CPT | Performed by: FAMILY MEDICINE

## 2021-10-08 PROCEDURE — 85027 COMPLETE CBC AUTOMATED: CPT | Performed by: FAMILY MEDICINE

## 2021-10-08 RX ORDER — FUROSEMIDE 20 MG
20 TABLET ORAL DAILY
Qty: 30 TABLET | Refills: 3 | Status: SHIPPED | OUTPATIENT
Start: 2021-10-08 | End: 2022-07-05

## 2021-10-08 ASSESSMENT — ENCOUNTER SYMPTOMS: BREAST MASS: 0

## 2021-10-08 ASSESSMENT — ACTIVITIES OF DAILY LIVING (ADL): CURRENT_FUNCTION: NO ASSISTANCE NEEDED

## 2021-10-08 ASSESSMENT — MIFFLIN-ST. JEOR: SCORE: 1263.23

## 2021-10-08 ASSESSMENT — PAIN SCALES - GENERAL: PAINLEVEL: NO PAIN (0)

## 2021-10-08 NOTE — LETTER
October 11, 2021      Whitley Wesley  [address]   [place] [address]        Dear ,    We are writing to inform you of your test results.    Your test results are attached. I am happy to let you know that they are stable.     The blood sugar is normal and you do not have diabetes. The kidneys are healthy. The cholesterol looks good for you also. The thyroid test is normal. We can recheck labs in 1 year.     Please call me if you have any questions about these test results or about your care.       Resulted Orders   CBC with platelets   Result Value Ref Range    WBC Count 8.2 4.0 - 11.0 10e3/uL    RBC Count 4.47 3.80 - 5.20 10e6/uL    Hemoglobin 13.6 11.7 - 15.7 g/dL    Hematocrit 42.3 35.0 - 47.0 %    MCV 95 78 - 100 fL    MCH 30.4 26.5 - 33.0 pg    MCHC 32.2 31.5 - 36.5 g/dL    RDW 13.0 10.0 - 15.0 %    Platelet Count 273 150 - 450 10e3/uL   Comprehensive metabolic panel   Result Value Ref Range    Sodium 142 133 - 144 mmol/L    Potassium 3.9 3.4 - 5.3 mmol/L    Chloride 108 94 - 109 mmol/L    Carbon Dioxide (CO2) 28 20 - 32 mmol/L    Anion Gap 6 3 - 14 mmol/L    Urea Nitrogen 16 7 - 30 mg/dL    Creatinine 0.72 0.52 - 1.04 mg/dL    Calcium 8.8 8.5 - 10.1 mg/dL    Glucose 99 70 - 99 mg/dL    Alkaline Phosphatase 85 40 - 150 U/L    AST 20 0 - 45 U/L    ALT 31 0 - 50 U/L    Protein Total 6.8 6.8 - 8.8 g/dL    Albumin 3.5 3.4 - 5.0 g/dL    Bilirubin Total 0.5 0.2 - 1.3 mg/dL    GFR Estimate 81 >60 mL/min/1.73m2      Comment:      As of July 11, 2021, eGFR is calculated by the CKD-EPI creatinine equation, without race adjustment. eGFR can be influenced by muscle mass, exercise, and diet. The reported eGFR is an estimation only and is only applicable if the renal function is stable.   Lipid panel reflex to direct LDL Non-fasting   Result Value Ref Range    Cholesterol 166 <200 mg/dL    Triglycerides 168 (H) <150 mg/dL    Direct Measure HDL 58 >=50 mg/dL    LDL Cholesterol Calculated 74 <=100 mg/dL    Non  HDL Cholesterol 108 <130 mg/dL    Patient Fasting > 8hrs? No     Narrative    Cholesterol  Desirable:  <200 mg/dL    Triglycerides  Normal:  Less than 150 mg/dL  Borderline High:  150-199 mg/dL  High:  200-499 mg/dL  Very High:  Greater than or equal to 500 mg/dL    Direct Measure HDL  Female:  Greater than or equal to 50 mg/dL   Male:  Greater than or equal to 40 mg/dL    LDL Cholesterol  Desirable:  <100mg/dL  Above Desirable:  100-129 mg/dL   Borderline High:  130-159 mg/dL   High:  160-189 mg/dL   Very High:  >= 190 mg/dL    Non HDL Cholesterol  Desirable:  130 mg/dL  Above Desirable:  130-159 mg/dL  Borderline High:  160-189 mg/dL  High:  190-219 mg/dL  Very High:  Greater than or equal to 220 mg/dL   Magnesium   Result Value Ref Range    Magnesium 2.4 (H) 1.6 - 2.3 mg/dL   TSH with free T4 reflex   Result Value Ref Range    TSH 3.01 0.40 - 4.00 mU/L   BNP-N terminal pro   Result Value Ref Range    N Terminal Pro BNP Outpatient 334 0 - 450 pg/mL      Comment:      Reference range shown and results flagged as abnormal are for the outpatient, non acute settings. Establishing a baseline value for each individual patient is useful for follow-up.    Suggested inpatient cut points for confirming diagnosis of CHF in an acute setting are:  >450 pg/mL (age 18 to less than 50)  >900 pg/mL (age 50 to less than 75)  >1800 pg/mL (75 yrs and older)    An inpatient or emergency department NT-proPBNP <300 pg/mL effectively rules out acute CHF, with 99% negative predictive value.           If you have any questions or concerns, please call the clinic at the number listed above.       Sincerely,      Mary Rolle MD

## 2021-10-09 NOTE — RESULT ENCOUNTER NOTE
Dear Whitley Wesley,    Your test results are attached. I am happy to let you know that they are stable.    The blood sugar is normal and you do not have diabetes. The kidneys are healthy. The cholesterol looks good for you also. The thyroid test is normal. We can recheck labs in 1 year.     Please call me if you have any questions about these test results or about your care.    Sincerely,    Mary Rolle MD

## 2021-12-23 ENCOUNTER — TELEPHONE (OUTPATIENT)
Dept: FAMILY MEDICINE | Facility: CLINIC | Age: 76
End: 2021-12-23
Payer: COMMERCIAL

## 2021-12-24 NOTE — TELEPHONE ENCOUNTER
This writer attempted to call Avelina PERKINS with [facility].    Reason for call: orders written below per Dr. Rolle.     Voicemail was not secure so did not leave detailed message. Left number to call back and request to speak with RN.     If GREGORIO Quan returns call: read provider orders as written below.       Sandy Lopez RN, BSN  [facility]

## 2021-12-27 ENCOUNTER — MEDICAL CORRESPONDENCE (OUTPATIENT)
Dept: HEALTH INFORMATION MANAGEMENT | Facility: CLINIC | Age: 76
End: 2021-12-27
Payer: COMMERCIAL

## 2021-12-27 NOTE — TELEPHONE ENCOUNTER
Vanessa MARX with [facility] called back. Writer relayed verbal orders.    Adriana Terrell RN  [facility]

## 2022-02-10 NOTE — PROGRESS NOTES
Assessment & Plan     Dependent edema  Recheck labs. Unclear which pharmacy to reorder to so memory care will need to let me know  - **Basic metabolic panel FUTURE 2mo; Future  - **Basic metabolic panel FUTURE 2mo    Late onset Alzheimer's dementia without behavioral disturbance (H)  No further treatment per neurology, in memory care unit    Polymyalgia rheumatica (H)  Asymptomatic at this time    Chronic congestive heart failure, unspecified heart failure type (H)  Per previous MD, will monitor for now    Morbid obesity (H)  Encourage exercise as able                   No follow-ups on file.    Macy Mata MD  [facility]    Lan Arreaga is a 76 year old who presents for the following health issues  accompanied by her son Deep Arora.    History of Present Illness       She eats 2-3 servings of fruits and vegetables daily.She consumes 1 sweetened beverage(s) daily.She exercises with enough effort to increase her heart rate 9 or less minutes per day.  She exercises with enough effort to increase her heart rate 3 or less days per week.   She is taking medications regularly.       Follow up edema in legs, feels it looks better    Establish care due to provider retired    Pt here with son  Needs refill of lasix for her edema. It is working well along with the compression stockings  Due to have BMP rechecked    Reviewed previous diagnosis  Pt with alzheimers. She is in memory care. She has had work up by neurology  No medication was given for this after discussion with the son    Pt with chf per the record. No updated echo as far as I can see  Last one in 2013 and was okay. None seen through care everywhere  Reviewed last note with PMD. It was suspected based on symptoms  Appears to be better so will monitor for now    Pt with h/o polymyalgia rheumatica. Now resolved,  No symptoms at this time  Pt with morbid obesity            Review of Systems   Constitutional, HEENT,  "cardiovascular, pulmonary, gi and gu systems are negative, except as otherwise noted.      Objective    /70   Pulse 92   Temp 99  F (37.2  C) (Oral)   Resp 15   Ht 1.53 m (5' 0.25\")   Wt 82.1 kg (181 lb)   LMP  (LMP Unknown)   BMI 35.06 kg/m    Body mass index is 35.06 kg/m .  Physical Exam   GENERAL: healthy, alert and no distress  RESP: lungs clear to auscultation - no rales, rhonchi or wheezes  CV: regular rate and rhythm, normal S1 S2, no S3 or S4, no murmur, click or rub, no peripheral edema and peripheral pulses strong  ABDOMEN: soft, nontender, no hepatosplenomegaly, no masses and bowel sounds normal  MS: no gross musculoskeletal defects noted, legs in compression bandages    No results found for this or any previous visit (from the past 24 hour(s)).            "

## 2022-02-14 ENCOUNTER — OFFICE VISIT (OUTPATIENT)
Dept: FAMILY MEDICINE | Facility: CLINIC | Age: 77
End: 2022-02-14
Payer: COMMERCIAL

## 2022-02-14 VITALS
SYSTOLIC BLOOD PRESSURE: 124 MMHG | DIASTOLIC BLOOD PRESSURE: 70 MMHG | HEART RATE: 92 BPM | BODY MASS INDEX: 35.53 KG/M2 | HEIGHT: 60 IN | WEIGHT: 181 LBS | TEMPERATURE: 99 F | RESPIRATION RATE: 15 BRPM

## 2022-02-14 DIAGNOSIS — R60.9 DEPENDENT EDEMA: Primary | ICD-10-CM

## 2022-02-14 DIAGNOSIS — E66.01 MORBID OBESITY (H): ICD-10-CM

## 2022-02-14 DIAGNOSIS — F02.80 LATE ONSET ALZHEIMER'S DEMENTIA WITHOUT BEHAVIORAL DISTURBANCE (H): ICD-10-CM

## 2022-02-14 DIAGNOSIS — G30.1 LATE ONSET ALZHEIMER'S DEMENTIA WITHOUT BEHAVIORAL DISTURBANCE (H): ICD-10-CM

## 2022-02-14 DIAGNOSIS — I50.9 CHRONIC CONGESTIVE HEART FAILURE, UNSPECIFIED HEART FAILURE TYPE (H): ICD-10-CM

## 2022-02-14 DIAGNOSIS — M35.3 POLYMYALGIA RHEUMATICA (H): ICD-10-CM

## 2022-02-14 LAB
ANION GAP SERPL CALCULATED.3IONS-SCNC: 7 MMOL/L (ref 3–14)
BUN SERPL-MCNC: 22 MG/DL (ref 7–30)
CALCIUM SERPL-MCNC: 9.7 MG/DL (ref 8.5–10.1)
CHLORIDE BLD-SCNC: 108 MMOL/L (ref 94–109)
CO2 SERPL-SCNC: 24 MMOL/L (ref 20–32)
CREAT SERPL-MCNC: 0.74 MG/DL (ref 0.52–1.04)
GFR SERPL CREATININE-BSD FRML MDRD: 83 ML/MIN/1.73M2
GLUCOSE BLD-MCNC: 143 MG/DL (ref 70–99)
POTASSIUM BLD-SCNC: 3.6 MMOL/L (ref 3.4–5.3)
SODIUM SERPL-SCNC: 139 MMOL/L (ref 133–144)

## 2022-02-14 PROCEDURE — 80048 BASIC METABOLIC PNL TOTAL CA: CPT | Performed by: FAMILY MEDICINE

## 2022-02-14 PROCEDURE — 99214 OFFICE O/P EST MOD 30 MIN: CPT | Performed by: FAMILY MEDICINE

## 2022-02-14 PROCEDURE — 36415 COLL VENOUS BLD VENIPUNCTURE: CPT | Performed by: FAMILY MEDICINE

## 2022-02-14 ASSESSMENT — MIFFLIN-ST. JEOR: SCORE: 1231.48

## 2022-02-14 ASSESSMENT — PAIN SCALES - GENERAL: PAINLEVEL: NO PAIN (0)

## 2022-02-14 NOTE — LETTER
February 15, 2022      Whitley B Lupillo  [address]   [place] [address]        Dear ,    We are writing to inform you of your test results.    Your electrolytes and kidney function are normal. Continue on your lasix since it is helping with the swelling in your legs.     Macy Mata MD       Resulted Orders   **Basic metabolic panel FUTURE 2mo   Result Value Ref Range    Sodium 139 133 - 144 mmol/L    Potassium 3.6 3.4 - 5.3 mmol/L    Chloride 108 94 - 109 mmol/L    Carbon Dioxide (CO2) 24 20 - 32 mmol/L    Anion Gap 7 3 - 14 mmol/L    Urea Nitrogen 22 7 - 30 mg/dL    Creatinine 0.74 0.52 - 1.04 mg/dL    Calcium 9.7 8.5 - 10.1 mg/dL    Glucose 143 (H) 70 - 99 mg/dL    GFR Estimate 83 >60 mL/min/1.73m2      Comment:      Effective December 21, 2021 eGFRcr in adults is calculated using the 2021 CKD-EPI creatinine equation which includes age and gender (Colton et al., NEJM, DOI: 10.1056/BMMApc2637869)

## 2022-03-17 ENCOUNTER — TELEPHONE (OUTPATIENT)
Dept: FAMILY MEDICINE | Facility: CLINIC | Age: 77
End: 2022-03-17
Payer: COMMERCIAL

## 2022-03-17 NOTE — TELEPHONE ENCOUNTER
Amish nurse from the [facility] is calling to inform provider that on 3/14/2022 lasix and tylenol were not given to patient. Nurse reports that patient is doing good, per her baseline, is having no symptoms. Vital signs per nurse have been   Temp: 96.6  B/P: 136/67   Pulse: 80  Resp: 18  O2: 97% on Room Air  [facility]  2nd Floor  Primary Care

## 2022-06-09 DIAGNOSIS — F41.9 ANXIETY: Primary | ICD-10-CM

## 2022-06-09 RX ORDER — ESCITALOPRAM OXALATE 5 MG/1
5 TABLET ORAL DAILY
Qty: 90 TABLET | Refills: 1 | Status: SHIPPED | OUTPATIENT
Start: 2022-06-09 | End: 2022-11-22

## 2022-06-09 NOTE — TELEPHONE ENCOUNTER
Looks like she has been on lexapro in the past for anxiety. Will have her restart that at 5 mg  Would like son and pt to have follow-up appt in one month to see how she is doing  Can be virtual if they would like.  Macy Mata MD

## 2022-06-09 NOTE — TELEPHONE ENCOUNTER
I left a generic message for Amish that the provider is going to start her on some medciation.  Left a message for her son Shari  to return a call to [phone].  Peggy Eaton R.N.    RN:  Whomever speaks with Deep please relay the message from  and assist him in making a follow up appointment for Whitley as Amish stated he takes care of these items.  Thank you. Peggy Eaton R.N.

## 2022-06-09 NOTE — TELEPHONE ENCOUNTER
There is a consent to communicate with Deep Berry(son) and Xiomy Menjivar(sister) on file dated 12/6/2019 (td-6/9/2022)    Provider:  Would you like a visit to address this issue for Whitley?  Thank you. Peggy Wellington spoke with son on Monday or Tuesday and informed him of Whitley symptoms.    Amish is calling stating that Whitley has been dx with anxiety in the past. Amish reprots that her anxiety has been increasing. She requires a lot of redirecting lately. Whitley  goes from room to room looking for something to do.  She reports that she is scared because she doesn't know what to do. Staff reported that she started crying in the middle of her shower and could not communicate with staff why she was crying. She is not on any anxiety medication and Amish is wondering if this may be helpful.

## 2022-06-09 NOTE — TELEPHONE ENCOUNTER
Deep son,  is informed of MD note below, as it is written. Verbalized good understanding.  [facility] coordinates picking up patient's medication.        Patient has future appointment.  Next 5 appointments (look out 90 days)    Aug 04, 2022  9:30 AM  (Arrive by 9:10 AM)  Provider Visit with Macy Mata MD  [facility] [address] [facility] [address] [phone]        Is it okay to wait until that time to be seen?      Please advise  Kenisha Mathew, RN

## 2022-06-10 RX ORDER — ESCITALOPRAM OXALATE 5 MG/1
5 TABLET ORAL DAILY
Qty: 90 TABLET | Refills: 1 | Status: CANCELLED | OUTPATIENT
Start: 2022-06-10

## 2022-06-10 NOTE — TELEPHONE ENCOUNTER
Provider:  Can you please print out a Prescription(s) for the escitalopram (LEXAPRO) 5 MG tablet and physically sign it and then give it back to RAFA WILSON to fax.  Thank you. Peggy Eaton R.N.    TC: Please fax to the number given below.  Thank you.    Amish is returning a call looking for the order for the Lexapro.  She needs a signed order faxed over to them.    Fax [phone]

## 2022-06-10 NOTE — TELEPHONE ENCOUNTER
Left message for sonDeep to call back.  Consent to communicate is identified, see below.   Kenisha Mathew RN

## 2022-06-10 NOTE — TELEPHONE ENCOUNTER
Provider:  She already had this appointment on file.  I believe they were asking if they could just do the follow up at this appointment.  Thank you. Peggy Eaton R.N.

## 2022-06-28 ENCOUNTER — MEDICAL CORRESPONDENCE (OUTPATIENT)
Dept: HEALTH INFORMATION MANAGEMENT | Facility: CLINIC | Age: 77
End: 2022-06-28

## 2022-07-01 ENCOUNTER — TELEPHONE (OUTPATIENT)
Dept: FAMILY MEDICINE | Facility: CLINIC | Age: 77
End: 2022-07-01

## 2022-07-01 NOTE — TELEPHONE ENCOUNTER
[facility] living calling with update on patient's bilateral lower extremity edema.  Patient's weight last week was 191 lbs and today it is 195.2 lbs.   Denies shortness of breath, chest pain, dizziness. Patient is feeling good and is eating normally. Big appetite this morning for breakfast.   Edema is bilateral with shiny feet and ankles.  Patient taking medications according to medication list including the furosemide.    Routing to provider to advise.  Consuelo CAMN, RN

## 2022-07-01 NOTE — TELEPHONE ENCOUNTER
Attempted to reach Young,  at the [facility], to relay provider message below as written. There was no answer. Left message to return call to a nurse at [phone].    Diana Mills, DORINAN, RN

## 2022-07-01 NOTE — TELEPHONE ENCOUNTER
I spoke with Vincenzo MARX and gave the message below. He reports that it is ordered to do daily weights for Whitley until 7/5/2022.  If her son can come in on Sunday and compare weights, he is to just ask the aide for her weight and then follow the plan below. He offers that if they change any medication at urgent care, if needed, they will need to get a signed Prescription(s) from the provider and hand it to the aide on and ask them to fax it over to the [facility] triage nurse.     I contacted son Deep.  Deep will follow the plan below  1. He will go to his mom's at [facility] and ask the aide for her weight that day (usually done about 7:00am).    2. If her weight is the same or less he will bring her to an appointment at [facility] with Dr. Miller at 8:30 a.m on Tuesday 7/5/2022.      3. If her weight is more, he will bring her to urgent care (hours given for [facility] on Sunday) on Sunday for evaluation. If they change any medication at urgent care, if needed, they will need to get a signed Prescription(s) from the provider and hand it to the aide on and ask them to fax it over to the [facility] triage nurse.     4. He was given the number for the nurses [phone] if he needs support    5. He was instructed that she will need to be seen no matter what on Tuesday for a follow up as she will need a plan going forward.  He will do this.  He will keep all other appointment scheduled until she is seen on 7/5/2022. Deep verbalized good understanding, agrees with plan and needs no further support.  Thank you. Peggy Eaton R.N.

## 2022-07-01 NOTE — TELEPHONE ENCOUNTER
Have her take an extra dose of lasix at noon today and tomorrow. Report back if improvement    Macy Mata MD

## 2022-07-01 NOTE — TELEPHONE ENCOUNTER
There is a consent to communicate with Deep Berry (son) and Xiomy Menjivar (sister) on file dated 12/6/2019.    Provider: Please see below. Did you want her to come into urgent care on Sunday so they can evaluate her legs? If you do, please place your plan here so they can follow this after evaluating her.  If you do not want her seen in urgent care, please place a plan here on how you would like to proceed. Thank you. Peggy Vidal report that she is in assisted living/memory care. The medications are managed by the nursing team. Young does not feel that the patient will understand these directions due to her memory capacity.     Young is there until 3:30 at [phone] or   Other nurse Vincenzo at  [phone] until 4:30 pm

## 2022-07-01 NOTE — TELEPHONE ENCOUNTER
Provider:  Please see below. Is it ok that they report back on Tuesday 7/5/2022.  Thank you. Peggy Vidal given the orders as written below.  She reports that there are no nurses on the weekend and no nurse back until Tuesday the 5th. Does the provider want a different plan.  If they do not hear back from us, due to the holiday, they will call back on Tuesday 7/5/2022 no matter what.

## 2022-07-01 NOTE — TELEPHONE ENCOUNTER
Please call family member to see if they can help  Have her weight herself on a scale she will have access to today and on Sunday  If weight is the same or lower on Sunday, just have her see me on Tuesday   If her weight continues to go up, then she should be seen in urgent care on Sunday.     The last weight we have on file per the nursing notes  was 195.2lbs  but they may be using a different scale  If she develops any sob, chest pain or dizziness than needs to be seen sooner.     Macy Mata MD
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
## 2022-07-05 ENCOUNTER — TELEPHONE (OUTPATIENT)
Dept: FAMILY MEDICINE | Facility: CLINIC | Age: 77
End: 2022-07-05

## 2022-07-05 ENCOUNTER — OFFICE VISIT (OUTPATIENT)
Dept: FAMILY MEDICINE | Facility: CLINIC | Age: 77
End: 2022-07-05
Payer: COMMERCIAL

## 2022-07-05 ENCOUNTER — MEDICAL CORRESPONDENCE (OUTPATIENT)
Dept: HEALTH INFORMATION MANAGEMENT | Facility: CLINIC | Age: 77
End: 2022-07-05

## 2022-07-05 VITALS
OXYGEN SATURATION: 96 % | WEIGHT: 192 LBS | BODY MASS INDEX: 37.69 KG/M2 | HEART RATE: 75 BPM | TEMPERATURE: 99 F | RESPIRATION RATE: 19 BRPM | HEIGHT: 60 IN

## 2022-07-05 DIAGNOSIS — R60.0 PEDAL EDEMA: Primary | ICD-10-CM

## 2022-07-05 DIAGNOSIS — F41.1 GENERALIZED ANXIETY DISORDER: ICD-10-CM

## 2022-07-05 DIAGNOSIS — R60.9 DEPENDENT EDEMA: ICD-10-CM

## 2022-07-05 DIAGNOSIS — R06.09 DYSPNEA ON EXERTION: ICD-10-CM

## 2022-07-05 LAB
ALBUMIN SERPL-MCNC: 3.4 G/DL (ref 3.4–5)
ALP SERPL-CCNC: 89 U/L (ref 40–150)
ALT SERPL W P-5'-P-CCNC: 35 U/L (ref 0–50)
ANION GAP SERPL CALCULATED.3IONS-SCNC: 5 MMOL/L (ref 3–14)
AST SERPL W P-5'-P-CCNC: 19 U/L (ref 0–45)
BILIRUB SERPL-MCNC: 0.5 MG/DL (ref 0.2–1.3)
BUN SERPL-MCNC: 13 MG/DL (ref 7–30)
CALCIUM SERPL-MCNC: 8.9 MG/DL (ref 8.5–10.1)
CHLORIDE BLD-SCNC: 109 MMOL/L (ref 94–109)
CO2 SERPL-SCNC: 28 MMOL/L (ref 20–32)
CREAT SERPL-MCNC: 0.68 MG/DL (ref 0.52–1.04)
ERYTHROCYTE [DISTWIDTH] IN BLOOD BY AUTOMATED COUNT: 13.7 % (ref 10–15)
GFR SERPL CREATININE-BSD FRML MDRD: 89 ML/MIN/1.73M2
GLUCOSE BLD-MCNC: 116 MG/DL (ref 70–99)
HCT VFR BLD AUTO: 42.6 % (ref 35–47)
HGB BLD-MCNC: 14.2 G/DL (ref 11.7–15.7)
MCH RBC QN AUTO: 31.3 PG (ref 26.5–33)
MCHC RBC AUTO-ENTMCNC: 33.3 G/DL (ref 31.5–36.5)
MCV RBC AUTO: 94 FL (ref 78–100)
NT-PROBNP SERPL-MCNC: 178 PG/ML (ref 0–1800)
PLATELET # BLD AUTO: 256 10E3/UL (ref 150–450)
POTASSIUM BLD-SCNC: 4.1 MMOL/L (ref 3.4–5.3)
PROT SERPL-MCNC: 7 G/DL (ref 6.8–8.8)
RBC # BLD AUTO: 4.54 10E6/UL (ref 3.8–5.2)
SODIUM SERPL-SCNC: 142 MMOL/L (ref 133–144)
T4 FREE SERPL-MCNC: 0.93 NG/DL (ref 0.76–1.46)
TSH SERPL DL<=0.005 MIU/L-ACNC: 4.03 MU/L (ref 0.4–4)
WBC # BLD AUTO: 8.8 10E3/UL (ref 4–11)

## 2022-07-05 PROCEDURE — 80053 COMPREHEN METABOLIC PANEL: CPT | Performed by: FAMILY MEDICINE

## 2022-07-05 PROCEDURE — 85027 COMPLETE CBC AUTOMATED: CPT | Performed by: FAMILY MEDICINE

## 2022-07-05 PROCEDURE — 36415 COLL VENOUS BLD VENIPUNCTURE: CPT | Performed by: FAMILY MEDICINE

## 2022-07-05 PROCEDURE — 84439 ASSAY OF FREE THYROXINE: CPT | Performed by: FAMILY MEDICINE

## 2022-07-05 PROCEDURE — 84443 ASSAY THYROID STIM HORMONE: CPT | Performed by: FAMILY MEDICINE

## 2022-07-05 PROCEDURE — 83880 ASSAY OF NATRIURETIC PEPTIDE: CPT | Performed by: FAMILY MEDICINE

## 2022-07-05 PROCEDURE — 99214 OFFICE O/P EST MOD 30 MIN: CPT | Performed by: FAMILY MEDICINE

## 2022-07-05 RX ORDER — FUROSEMIDE 20 MG
20 TABLET ORAL 2 TIMES DAILY
Qty: 180 TABLET | Refills: 1 | Status: SHIPPED | OUTPATIENT
Start: 2022-07-05 | End: 2022-11-22

## 2022-07-05 ASSESSMENT — PAIN SCALES - GENERAL: PAINLEVEL: NO PAIN (0)

## 2022-07-05 NOTE — TELEPHONE ENCOUNTER
"Incoming call from nurse Vincenzo, at [facility].  Vincenzo states pt left appointment today with a handwritten note from provider that states \"BP check?\"    Vincenzo, nurse, is inquiring if there are further instructions for the Assisted Living staff regarding pt's BP today or this week?     He states AVS note is unclear, and inquires if Assisted Living should be checking pt's BP today, and if so, how frequently?    If Assisted Living should check pt BP, what action would provider like taken based on the BP result? What parameters require action?    DORINA OliverN, RN    "

## 2022-07-05 NOTE — PROGRESS NOTES
"  Assessment & Plan     Pedal edema  Check labs, increase lasix to 20 bid, follow-up in 2 weeks with lab appointment  - **CBC with platelets FUTURE 2mo; Future  - **Comprehensive metabolic panel FUTURE 2mo; Future  - **TSH with free T4 reflex FUTURE 2mo; Future  - BNP-N terminal pro; Future  - **CBC with platelets FUTURE 2mo  - **Comprehensive metabolic panel FUTURE 2mo  - **TSH with free T4 reflex FUTURE 2mo  - BNP-N terminal pro  - T4 free    Dyspnea on exertion     - BNP-N terminal pro; Future  - BNP-N terminal pro    Dependent edema    - furosemide (LASIX) 20 MG tablet; Take 1 tablet (20 mg) by mouth 2 times daily    STEFF    Started on lexapro for this. Son is not sure if it is helping as he only sees her once per week  He will ask the nurses to comment on her anxiety         BMI:   Estimated body mass index is 37.19 kg/m  as calculated from the following:    Height as of this encounter: 1.53 m (5' 0.25\").    Weight as of this encounter: 87.1 kg (192 lb).   Weight management plan: Discussed healthy diet and exercise guidelines        No follow-ups on file.    Macy Mata MD  [facility]    Lan Arreaga is a 77 year old accompanied by her son., presenting for the following health issues:  No chief complaint on file.      History of Present Illness       Reason for visit:  Follow up    She eats 2-3 servings of fruits and vegetables daily.She consumes 0 sweetened beverage(s) daily.She exercises with enough effort to increase her heart rate 9 or less minutes per day.  She exercises with enough effort to increase her heart rate 3 or less days per week.   She is taking medications regularly.       Swelling in feet and legs    Unable to take blood pressure, cried and said it hurt. Tried on upper right arm and lower left arm    Received call from nursing late last week  Pt lives at assisted living  They noted increased swelling in her feet and weight gain of about 4 lbs  Was given 2 " extra doses of lasix over weekend and she dropped a pound  She is here with her son today  BP is stable. Swelling the same  This is not new for her but has worsened.  It gets better with elevation. They have not been doing that              Review of Systems   Constitutional, HEENT, cardiovascular, pulmonary, gi and gu systems are negative, except as otherwise noted.      Objective    LMP  (LMP Unknown)   There is no height or weight on file to calculate BMI.  Physical Exam   GENERAL: healthy, alert and no distress  RESP: lungs clear to auscultation - no rales, rhonchi or wheezes  CV: regular rate and rhythm, normal S1 S2, no S3 or S4, no murmur, click or rub, no peripheral edema and peripheral pulses strong  MS: no gross musculoskeletal defects noted, 2+ edema    Results for orders placed or performed in visit on 07/05/22 (from the past 24 hour(s))   **CBC with platelets FUTURE 2mo   Result Value Ref Range    WBC Count 8.8 4.0 - 11.0 10e3/uL    RBC Count 4.54 3.80 - 5.20 10e6/uL    Hemoglobin 14.2 11.7 - 15.7 g/dL    Hematocrit 42.6 35.0 - 47.0 %    MCV 94 78 - 100 fL    MCH 31.3 26.5 - 33.0 pg    MCHC 33.3 31.5 - 36.5 g/dL    RDW 13.7 10.0 - 15.0 %    Platelet Count 256 150 - 450 10e3/uL   **Comprehensive metabolic panel FUTURE 2mo   Result Value Ref Range    Sodium 142 133 - 144 mmol/L    Potassium 4.1 3.4 - 5.3 mmol/L    Chloride 109 94 - 109 mmol/L    Carbon Dioxide (CO2) 28 20 - 32 mmol/L    Anion Gap 5 3 - 14 mmol/L    Urea Nitrogen 13 7 - 30 mg/dL    Creatinine 0.68 0.52 - 1.04 mg/dL    Calcium 8.9 8.5 - 10.1 mg/dL    Glucose 116 (H) 70 - 99 mg/dL    Alkaline Phosphatase 89 40 - 150 U/L    AST 19 0 - 45 U/L    ALT 35 0 - 50 U/L    Protein Total 7.0 6.8 - 8.8 g/dL    Albumin 3.4 3.4 - 5.0 g/dL    Bilirubin Total 0.5 0.2 - 1.3 mg/dL    GFR Estimate 89 >60 mL/min/1.73m2   **TSH with free T4 reflex FUTURE 2mo   Result Value Ref Range    TSH 4.03 (H) 0.40 - 4.00 mU/L   BNP-N terminal pro   Result Value Ref Range     N Terminal Pro BNP Outpatient 178 0 - 1,800 pg/mL   T4 free   Result Value Ref Range    Free T4 0.93 0.76 - 1.46 ng/dL                   .  ..

## 2022-07-05 NOTE — TELEPHONE ENCOUNTER
Provider:    1. What is too low and too high?  2. If it is too low or too high, what would you like them to do about this, just report it to us?  3. Would you like them to report the blood pressure to you daily or all of them in a week?

## 2022-07-05 NOTE — TELEPHONE ENCOUNTER
BP is fine here  Doubled dose of lasix due to pedal edema  Have them check it daily for the next week to make sure it does not run too low    Macy Mata MD

## 2022-07-05 NOTE — TELEPHONE ENCOUNTER
Vincenzo, nurse, at [facility] is given written orders.    Advise to report symptoms sooner, otherwise call back at end of week with value.  Vincenzo noted, does not have nursing on weekends.     Verbalized good understanding.   Kenisha Mathew RN   ,

## 2022-07-05 NOTE — LETTER
July 6, 2022      Whitley Wesley  [address] [place] [address]        Dear Whitley,     Your thyroid is okay. Your Free T4 is at goal.   Your BNP is normal which suggests the swelling in your legs is not from heart failure  Your electrolytes, kidney and liver function are normal  Your hemoglobin is normal.     Resulted Orders   **CBC with platelets FUTURE 2mo   Result Value Ref Range    WBC Count 8.8 4.0 - 11.0 10e3/uL    RBC Count 4.54 3.80 - 5.20 10e6/uL    Hemoglobin 14.2 11.7 - 15.7 g/dL    Hematocrit 42.6 35.0 - 47.0 %    MCV 94 78 - 100 fL    MCH 31.3 26.5 - 33.0 pg    MCHC 33.3 31.5 - 36.5 g/dL    RDW 13.7 10.0 - 15.0 %    Platelet Count 256 150 - 450 10e3/uL   **Comprehensive metabolic panel FUTURE 2mo   Result Value Ref Range    Sodium 142 133 - 144 mmol/L    Potassium 4.1 3.4 - 5.3 mmol/L    Chloride 109 94 - 109 mmol/L    Carbon Dioxide (CO2) 28 20 - 32 mmol/L    Anion Gap 5 3 - 14 mmol/L    Urea Nitrogen 13 7 - 30 mg/dL    Creatinine 0.68 0.52 - 1.04 mg/dL    Calcium 8.9 8.5 - 10.1 mg/dL    Glucose 116 (H) 70 - 99 mg/dL    Alkaline Phosphatase 89 40 - 150 U/L    AST 19 0 - 45 U/L    ALT 35 0 - 50 U/L    Protein Total 7.0 6.8 - 8.8 g/dL    Albumin 3.4 3.4 - 5.0 g/dL    Bilirubin Total 0.5 0.2 - 1.3 mg/dL    GFR Estimate 89 >60 mL/min/1.73m2      Comment:      Effective December 21, 2021 eGFRcr in adults is calculated using the 2021 CKD-EPI creatinine equation which includes age and gender (Colton et al., NEJM, DOI: 10.1056/PSXOvs2660144)   **TSH with free T4 reflex FUTURE 2mo   Result Value Ref Range    TSH 4.03 (H) 0.40 - 4.00 mU/L   BNP-N terminal pro   Result Value Ref Range    N Terminal Pro BNP Outpatient 178 0 - 1,800 pg/mL      Comment:      Reference range shown and results flagged as abnormal are for the outpatient, non acute settings. Establishing a baseline value for each individual patient is useful for follow-up.    Suggested inpatient cut points for confirming diagnosis of CHF in an  acute setting are:  >450 pg/mL (age 18 to less than 50)  >900 pg/mL (age 50 to less than 75)  >1800 pg/mL (75 yrs and older)    An inpatient or emergency department NT-proPBNP <300 pg/mL effectively rules out acute CHF, with 99% negative predictive value.       T4 free   Result Value Ref Range    Free T4 0.93 0.76 - 1.46 ng/dL       If you have any questions or concerns, please call the clinic at the number listed above.       Sincerely,      Macy Mata MD

## 2022-07-05 NOTE — TELEPHONE ENCOUNTER
It is too low if she is getting lightheaded when standing up or < 110/60  Too high if > 160/100  Report it if symptomatic  Just give me readings at end of week    Macy Mata MD

## 2022-07-06 ENCOUNTER — MEDICAL CORRESPONDENCE (OUTPATIENT)
Dept: FAMILY MEDICINE | Facility: CLINIC | Age: 77
End: 2022-07-06

## 2022-07-13 ENCOUNTER — MEDICAL CORRESPONDENCE (OUTPATIENT)
Dept: FAMILY MEDICINE | Facility: CLINIC | Age: 77
End: 2022-07-13

## 2022-07-15 ENCOUNTER — LAB (OUTPATIENT)
Dept: LAB | Facility: CLINIC | Age: 77
End: 2022-07-15
Payer: COMMERCIAL

## 2022-07-15 DIAGNOSIS — R60.0 PEDAL EDEMA: ICD-10-CM

## 2022-07-15 LAB
ANION GAP SERPL CALCULATED.3IONS-SCNC: 7 MMOL/L (ref 3–14)
BUN SERPL-MCNC: 19 MG/DL (ref 7–30)
CALCIUM SERPL-MCNC: 9.1 MG/DL (ref 8.5–10.1)
CHLORIDE BLD-SCNC: 107 MMOL/L (ref 94–109)
CO2 SERPL-SCNC: 26 MMOL/L (ref 20–32)
CREAT SERPL-MCNC: 0.86 MG/DL (ref 0.52–1.04)
GFR SERPL CREATININE-BSD FRML MDRD: 69 ML/MIN/1.73M2
GLUCOSE BLD-MCNC: 137 MG/DL (ref 70–99)
POTASSIUM BLD-SCNC: 3.9 MMOL/L (ref 3.4–5.3)
SODIUM SERPL-SCNC: 140 MMOL/L (ref 133–144)

## 2022-07-15 PROCEDURE — 80048 BASIC METABOLIC PNL TOTAL CA: CPT

## 2022-07-15 PROCEDURE — 36415 COLL VENOUS BLD VENIPUNCTURE: CPT

## 2022-08-04 ENCOUNTER — MEDICAL CORRESPONDENCE (OUTPATIENT)
Dept: HEALTH INFORMATION MANAGEMENT | Facility: CLINIC | Age: 77
End: 2022-08-04

## 2022-09-03 ENCOUNTER — HEALTH MAINTENANCE LETTER (OUTPATIENT)
Age: 77
End: 2022-09-03

## 2022-09-28 ENCOUNTER — MEDICAL CORRESPONDENCE (OUTPATIENT)
Dept: HEALTH INFORMATION MANAGEMENT | Facility: CLINIC | Age: 77
End: 2022-09-28

## 2022-10-13 ENCOUNTER — TELEPHONE (OUTPATIENT)
Dept: FAMILY MEDICINE | Facility: CLINIC | Age: 77
End: 2022-10-13

## 2022-10-13 NOTE — TELEPHONE ENCOUNTER
Gabriel from the [facility] calling to report patient's 12pm Furosemide was not given on 10/10  Weight has not increased 5lbs in past week  No significant change in edema in legs  Patient has not missed any further doses    To provider to advise if any further action is needed      Bijal BIANCHI BSN, RN

## 2022-10-13 NOTE — TELEPHONE ENCOUNTER
aGbriel with [facility] called back, relayed provider message below. Gabriel verbalized understanding. No further questions or concerns at this time.    Alice Navarrete RN    [facility]

## 2022-11-22 ENCOUNTER — OFFICE VISIT (OUTPATIENT)
Dept: FAMILY MEDICINE | Facility: CLINIC | Age: 77
End: 2022-11-22
Payer: COMMERCIAL

## 2022-11-22 VITALS
TEMPERATURE: 98.7 F | WEIGHT: 190 LBS | HEART RATE: 89 BPM | BODY MASS INDEX: 37.3 KG/M2 | SYSTOLIC BLOOD PRESSURE: 131 MMHG | RESPIRATION RATE: 17 BRPM | HEIGHT: 60 IN | OXYGEN SATURATION: 94 % | DIASTOLIC BLOOD PRESSURE: 79 MMHG

## 2022-11-22 DIAGNOSIS — Z00.00 ENCOUNTER FOR MEDICARE ANNUAL WELLNESS EXAM: Primary | ICD-10-CM

## 2022-11-22 DIAGNOSIS — E78.5 HYPERLIPIDEMIA LDL GOAL <160: ICD-10-CM

## 2022-11-22 DIAGNOSIS — F41.9 ANXIETY: ICD-10-CM

## 2022-11-22 DIAGNOSIS — R60.9 DEPENDENT EDEMA: ICD-10-CM

## 2022-11-22 PROCEDURE — G0438 PPPS, INITIAL VISIT: HCPCS | Performed by: FAMILY MEDICINE

## 2022-11-22 PROCEDURE — 36415 COLL VENOUS BLD VENIPUNCTURE: CPT | Performed by: FAMILY MEDICINE

## 2022-11-22 PROCEDURE — 90662 IIV NO PRSV INCREASED AG IM: CPT | Performed by: FAMILY MEDICINE

## 2022-11-22 PROCEDURE — 0134A COVID-19 VACCINE BIVALENT BOOSTER 18+ (MODERNA): CPT | Performed by: FAMILY MEDICINE

## 2022-11-22 PROCEDURE — G0008 ADMIN INFLUENZA VIRUS VAC: HCPCS | Performed by: FAMILY MEDICINE

## 2022-11-22 PROCEDURE — 80048 BASIC METABOLIC PNL TOTAL CA: CPT | Performed by: FAMILY MEDICINE

## 2022-11-22 PROCEDURE — 99213 OFFICE O/P EST LOW 20 MIN: CPT | Mod: 25 | Performed by: FAMILY MEDICINE

## 2022-11-22 PROCEDURE — 80061 LIPID PANEL: CPT | Performed by: FAMILY MEDICINE

## 2022-11-22 PROCEDURE — 91313 COVID-19 VACCINE BIVALENT BOOSTER 18+ (MODERNA): CPT | Performed by: FAMILY MEDICINE

## 2022-11-22 RX ORDER — ESCITALOPRAM OXALATE 5 MG/1
5 TABLET ORAL DAILY
Qty: 90 TABLET | Refills: 3 | Status: SHIPPED | OUTPATIENT
Start: 2022-11-22

## 2022-11-22 RX ORDER — FUROSEMIDE 20 MG
20 TABLET ORAL 2 TIMES DAILY
Qty: 180 TABLET | Refills: 3 | Status: SHIPPED | OUTPATIENT
Start: 2022-11-22

## 2022-11-22 ASSESSMENT — ACTIVITIES OF DAILY LIVING (ADL)
CURRENT_FUNCTION: PREPARING MEALS REQUIRES ASSISTANCE
CURRENT_FUNCTION: SHOPPING REQUIRES ASSISTANCE
CURRENT_FUNCTION: MONEY MANAGEMENT REQUIRES ASSISTANCE
CURRENT_FUNCTION: LAUNDRY REQUIRES ASSISTANCE
CURRENT_FUNCTION: HOUSEWORK REQUIRES ASSISTANCE
CURRENT_FUNCTION: MEDICATION ADMINISTRATION REQUIRES ASSISTANCE
CURRENT_FUNCTION: BATHING REQUIRES ASSISTANCE
CURRENT_FUNCTION: TRANSPORTATION REQUIRES ASSISTANCE

## 2022-11-22 NOTE — PATIENT INSTRUCTIONS
Please check her BP 2-3 times per week x 2 weeks and report to me if > 140/90  Pt prefers wrist cuff if available    Please elevate Whitley's legs 30-60 minutes daily in recliner. Legs should be higher then her buttocks      Patient Education   Personalized Prevention Plan  You are due for the preventive services outlined below.  Your care team is available to assist you in scheduling these services.  If you have already completed any of these items, please share that information with your care team to update in your medical record.  Health Maintenance Due   Topic Date Due    Heart Failure Action Plan  Never done    Hepatitis C Screening  Never done    Zoster (Shingles) Vaccine (1 of 2) Never done    Eye Exam  07/22/2020    COVID-19 Vaccine (4 - Booster for Moderna series) 02/01/2022    Flu Vaccine (1) 09/01/2022    Annual Wellness Visit  10/08/2022    Cholesterol Lab  10/08/2022    ANNUAL REVIEW OF HM ORDERS  10/08/2022    FALL RISK ASSESSMENT  10/08/2022

## 2022-11-22 NOTE — LETTER
"My Heart Failure Action Plan   Name: Whitley Wesley    YOB: 1945   Date: 11/22/2022    My doctor: No Ref-Primary, Physician     [facility]     [address] [address] [phone]  My Diagnosis: {HF TYPE:804746::\"Systolic Heart Failure\"}   My Ejection Fraction: No results found for: LVEF   {EF% (Optional):987222}   My Exercise Goal: 30 minutes daily     My Weight Plan:   Wt Readings from Last 2 Encounters:   11/22/22 86.2 kg (190 lb)   07/05/22 87.1 kg (192 lb)     Weigh yourself daily using the same scale. If you gain more than 2 pounds in 24 hours or 5 pounds in a week {HF WEIGHT GOAL:120457}    My Diet Goal: { :108235::\"No added salt\"}    Emergency Room Visits:    Our goal is to improve your quality of life and help you avoid a visit to the emergency room or hospital.  If we work together, we can achieve this goal. But, if you feel you need to call 911 or go to the emergency room, please do so.  If you go to the emergency room, please bring your list of medicines and your daily weight chart with you.       GREEN ZONE     Doing well today    Weight gained is no more than 2 pounds a day or 5 pounds a week.    No swelling in feet, ankles, legs or stomach.    No more swelling than usual.    No more trouble breathing than usual.    No change in my sleep.    No other problems. Actions:    I am doing fine.  I will take my medicine, follow my diet, see my doctor, exercise, and watch for symptoms.           YELLOW ZONE         Having a bad day or flare up    Weight gain of more than 2 pounds in one day or 5 pounds in one week.    New swelling in ankle, leg, knee or thigh.    Bloating in belly, pants feel tighter.    Swelling in hands or face.    Coughing or trouble breathing while walking or talking.    Harder to breathe last night.    Have trouble sleeping, wake up short of breath.    Much more tired than usual.    Not eating.    Pain in my chest or bad leg " cramps.    Feel weak or dizzy. Actions:    I need to take action and call my doctor or nurse today.                 RED ZONE         Need medical care now    Weight gain of 5 pounds overnight.    Chest pain or pressure that does not go away.    Feel less alert.    Wheezing or have trouble breathing when at rest.    Cannot sleep lying down.    Cannot take my water pill.    Pass out or faint. Actions:    I need to call my doctor or nurse now!    Call 911 if I have chest pain or cannot breathe.

## 2022-11-22 NOTE — PROGRESS NOTES
"SUBJECTIVE:   Whitley is a 77 year old who presents for Preventive Visit.    Are you in the first 12 months of your Medicare coverage?  No    Healthy Habits:     In general, how would you rate your overall health?  Fair    Frequency of exercise:  None    Do you usually eat at least 4 servings of fruit and vegetables a day, include whole grains    & fiber and avoid regularly eating high fat or \"junk\" foods?  No    Taking medications regularly:  Yes    Medication side effects:  None    Ability to successfully perform activities of daily living:  Transportation requires assistance, shopping requires assistance, preparing meals requires assistance, housework requires assistance, bathing requires assistance, laundry requires assistance, medication administration requires assistance and money management requires assistance    Home Safety:  No safety concerns identified    Hearing Impairment:  No hearing concerns    In the past 6 months, have you been bothered by leaking of urine? Yes    In general, how would you rate your overall mental or emotional health?  Good      PHQ-2 Total Score: 0    Additional concerns today:  No      Have you ever done Advance Care Planning? (For example, a Health Directive, POLST, or a discussion with a medical provider or your loved ones about your wishes): Yes, patient states has an Advance Care Planning document and will bring a copy to the clinic.       Fall risk  Fallen 2 or more times in the past year?: No  Any fall with injury in the past year?: No    Cognitive Screening Not appropriate due to mental handicap    Do you have sleep apnea, excessive snoring or daytime drowsiness?: no    Reviewed and updated as needed this visit by clinical staff   Tobacco  Allergies  Meds              Reviewed and updated as needed this visit by Provider                 Social History     Tobacco Use     Smoking status: Never     Smokeless tobacco: Never   Substance Use Topics     Alcohol use: No " "        Alcohol Use 11/22/2022   Prescreen: >3 drinks/day or >7 drinks/week? No   Prescreen: >3 drinks/day or >7 drinks/week? -     Pt here with son  Lives at [facility]  Has dementia. Does not remember childrens or grandchildrens names  Is on lasix for pedal edema and lexapro for anxiety  Son feels they are working well.   They are not elevating her legs but she does get her compression stockings put on most days  He has no concerbs.           Current providers sharing in care for this patient include:   Patient Care Team:  No Ref-Primary, Physician as PCP - General  Macy Mata MD as Assigned PCP    The following health maintenance items are reviewed in Epic and correct as of today:  Health Maintenance   Topic Date Due     HF ACTION PLAN  Never done     ZOSTER IMMUNIZATION (1 of 2) Never done     EYE EXAM  07/22/2020     MEDICARE ANNUAL WELLNESS VISIT  10/08/2022     LIPID  10/08/2022     ANNUAL REVIEW OF HM ORDERS  10/08/2022     BMP  01/15/2023     DTAP/TDAP/TD IMMUNIZATION (2 - Td or Tdap) 02/04/2023     ALT  07/05/2023     CBC  07/05/2023     FALL RISK ASSESSMENT  11/22/2023     ADVANCE CARE PLANNING  11/22/2027     DEXA  03/13/2029     TSH W/FREE T4 REFLEX  Completed     PHQ-2 (once per calendar year)  Completed     INFLUENZA VACCINE  Completed     Pneumococcal Vaccine: 65+ Years  Completed     COVID-19 Vaccine  Completed     IPV IMMUNIZATION  Aged Out     MENINGITIS IMMUNIZATION  Aged Out     HEPATITIS C SCREENING  Discontinued     COLORECTAL CANCER SCREENING  Discontinued     Lab work is in process      Mammogram Screening - Patient over age 75, has elected to discontinue screenings.  Pertinent mammograms are reviewed under the imaging tab.    Review of Systems  Constitutional, HEENT, cardiovascular, pulmonary, gi and gu systems are negative, except as otherwise noted.    OBJECTIVE:   /79   Pulse 89   Temp 98.7  F (37.1  C) (Tympanic)   Resp 17   Ht 1.53 m (5' 0.25\")   Wt 86.2 kg (190 " "lb)   LMP  (LMP Unknown)   SpO2 94%   BMI 36.80 kg/m   Estimated body mass index is 36.8 kg/m  as calculated from the following:    Height as of this encounter: 1.53 m (5' 0.25\").    Weight as of this encounter: 86.2 kg (190 lb).  Physical Exam  GENERAL: healthy, alert and no distress  EYES: Eyes grossly normal to inspection, PERRL and conjunctivae and sclerae normal  HENT: ear canals and TM's normal, nose and mouth without ulcers or lesions  NECK: no adenopathy, no asymmetry, masses, or scars and thyroid normal to palpation  RESP: lungs clear to auscultation - no rales, rhonchi or wheezes  CV: regular rate and rhythm, normal S1 S2, no S3 or S4, no murmur, click or rub, no peripheral edema and peripheral pulses strong  ABDOMEN: soft, nontender, no hepatosplenomegaly, no masses and bowel sounds normal  MS: no gross musculoskeletal defects noted, no edema  SKIN: no suspicious lesions or rashes  NEURO: Normal strength and tone, mentation intact and speech normal  PSYCH: mentation appears normal, affect normal/bright    Diagnostic Test Results:  Labs reviewed in Epic    ASSESSMENT / PLAN:   (Z00.00) Encounter for Medicare annual wellness exam  (primary encounter diagnosis)  Comment: completed  Plan:     (F41.9) Anxiety  Comment: doing well, very calm in todays visit, no behavioral concerns  Plan: escitalopram (LEXAPRO) 5 MG tablet            (R60.9) Dependent edema  Comment: elevate, compression stockings and lasix  Plan: furosemide (LASIX) 20 MG tablet, **Basic         metabolic panel FUTURE 2mo            (E78.5) Hyperlipidemia LDL goal <160  Comment:   Plan: Lipid panel reflex to direct LDL Fasting              Patient has been advised of split billing requirements and indicates understanding: Yes      COUNSELING:  Reviewed preventive health counseling, as reflected in patient instructions       Regular exercise       Healthy diet/nutrition       Vision screening       Dental care        She reports that she has " never smoked. She has never used smokeless tobacco.      Appropriate preventive services were discussed with this patient, including applicable screening as appropriate for cardiovascular disease, diabetes, osteopenia/osteoporosis, and glaucoma.  As appropriate for age/gender, discussed screening for colorectal cancer, prostate cancer, breast cancer, and cervical cancer. Checklist reviewing preventive services available has been given to the patient.    Reviewed patients plan of care and provided an AVS. The Intermediate Care Plan ( asthma action plan, low back pain action plan, and migraine action plan) for Whitley meets the Care Plan requirement. This Care Plan has been established and reviewed with the Patient and son.      Macy Mata MD  [facility]    Identified Health Risks:

## 2022-11-23 ENCOUNTER — TELEPHONE (OUTPATIENT)
Dept: FAMILY MEDICINE | Facility: CLINIC | Age: 77
End: 2022-11-23

## 2022-11-23 LAB
ANION GAP SERPL CALCULATED.3IONS-SCNC: 7 MMOL/L (ref 3–14)
BUN SERPL-MCNC: 13 MG/DL (ref 7–30)
CALCIUM SERPL-MCNC: 8.9 MG/DL (ref 8.5–10.1)
CHLORIDE BLD-SCNC: 109 MMOL/L (ref 94–109)
CHOLEST SERPL-MCNC: 184 MG/DL
CO2 SERPL-SCNC: 26 MMOL/L (ref 20–32)
CREAT SERPL-MCNC: 0.72 MG/DL (ref 0.52–1.04)
FASTING STATUS PATIENT QL REPORTED: NO
GFR SERPL CREATININE-BSD FRML MDRD: 85 ML/MIN/1.73M2
GLUCOSE BLD-MCNC: 108 MG/DL (ref 70–99)
HDLC SERPL-MCNC: 53 MG/DL
LDLC SERPL CALC-MCNC: 84 MG/DL
NONHDLC SERPL-MCNC: 131 MG/DL
POTASSIUM BLD-SCNC: 3.7 MMOL/L (ref 3.4–5.3)
SODIUM SERPL-SCNC: 142 MMOL/L (ref 133–144)
TRIGL SERPL-MCNC: 234 MG/DL

## 2022-11-23 NOTE — TELEPHONE ENCOUNTER
FRANCISCO J Wellington from [facility] returned a call.  All residents on the floor were tested.  Patient tested positive for COVID when returned from doctor's visit.    At this time, patient does not have any symptoms.  The nurse is not sure if the son would like her to start Paxlovid.    If patient develops any symptoms to return a call to the clinic within 5 days from positive test.    Dahiana Garrido RN  [facility]

## 2022-11-23 NOTE — TELEPHONE ENCOUNTER
Called and left voicemail for [facility] to call clinic at [phone] to read provider note below and triage symptoms. Patient in office visit yesterday. Patient tested positive for covid on at home test.     Provider message:    When did she develop symptoms?  Are we within the window of treatment?     MD Sunshine Sher RN

## 2022-12-06 ENCOUNTER — TELEPHONE (OUTPATIENT)
Dept: FAMILY MEDICINE | Facility: CLINIC | Age: 77
End: 2022-12-06

## 2022-12-06 NOTE — TELEPHONE ENCOUNTER
Provider:  To clarify  1. Would you like them to keep doing what they are doing and daily?  2. Would you like them to do this until the area is healed? Thank you. Peggy Eaton R.N.

## 2022-12-06 NOTE — TELEPHONE ENCOUNTER
Patient fell 12/2, went to ED as had a skin tear on left elbow, received 8 stitches  ED discharge orders are to continue wound care  Have currently been cleaning with wound cleanser, apply bacitracin to area as still has small skin tear at suture line and cover with foam dressing  Needing orders to either:      1. Keep open to air        or        2. continue current dressing change.   If should continue with dressing change, needs to know how often to change dressing and for how long  Son was instructed to schedule suture removal appointment        To provider to advise    Bijal CAMN, RN

## 2022-12-07 ENCOUNTER — MEDICAL CORRESPONDENCE (OUTPATIENT)
Dept: FAMILY MEDICINE | Facility: CLINIC | Age: 77
End: 2022-12-07

## 2022-12-07 NOTE — TELEPHONE ENCOUNTER
Returned call to [facility] approving requested orders.    Consuelo CAMN, RN

## 2022-12-13 ENCOUNTER — MEDICAL CORRESPONDENCE (OUTPATIENT)
Dept: HEALTH INFORMATION MANAGEMENT | Facility: CLINIC | Age: 77
End: 2022-12-13

## 2023-03-03 ENCOUNTER — LAB REQUISITION (OUTPATIENT)
Dept: LAB | Facility: CLINIC | Age: 78
End: 2023-03-03
Payer: COMMERCIAL

## 2023-03-03 DIAGNOSIS — I50.9 HEART FAILURE, UNSPECIFIED (H): ICD-10-CM

## 2023-03-06 LAB
ANION GAP SERPL CALCULATED.3IONS-SCNC: 13 MMOL/L (ref 7–15)
BUN SERPL-MCNC: 18 MG/DL (ref 8–23)
CALCIUM SERPL-MCNC: 9.4 MG/DL (ref 8.8–10.2)
CHLORIDE SERPL-SCNC: 105 MMOL/L (ref 98–107)
CREAT SERPL-MCNC: 0.79 MG/DL (ref 0.51–0.95)
DEPRECATED HCO3 PLAS-SCNC: 24 MMOL/L (ref 22–29)
GFR SERPL CREATININE-BSD FRML MDRD: 76 ML/MIN/1.73M2
GLUCOSE SERPL-MCNC: 91 MG/DL (ref 70–99)
POTASSIUM SERPL-SCNC: 4 MMOL/L (ref 3.4–5.3)
SODIUM SERPL-SCNC: 142 MMOL/L (ref 136–145)

## 2023-03-06 PROCEDURE — P9603 ONE-WAY ALLOW PRORATED MILES: HCPCS | Mod: ORL | Performed by: INTERNAL MEDICINE

## 2023-03-06 PROCEDURE — 36415 COLL VENOUS BLD VENIPUNCTURE: CPT | Mod: ORL | Performed by: INTERNAL MEDICINE

## 2023-03-06 PROCEDURE — 80048 BASIC METABOLIC PNL TOTAL CA: CPT | Mod: ORL | Performed by: INTERNAL MEDICINE

## 2023-12-29 ENCOUNTER — LAB REQUISITION (OUTPATIENT)
Dept: LAB | Facility: CLINIC | Age: 78
End: 2023-12-29
Payer: COMMERCIAL

## 2023-12-29 DIAGNOSIS — I50.9 HEART FAILURE, UNSPECIFIED (H): ICD-10-CM

## 2023-12-29 DIAGNOSIS — H28 CATARACT IN DISEASES CLASSIFIED ELSEWHERE: ICD-10-CM

## 2024-01-03 ENCOUNTER — LAB REQUISITION (OUTPATIENT)
Dept: LAB | Facility: CLINIC | Age: 79
End: 2024-01-03
Payer: COMMERCIAL

## 2024-01-03 DIAGNOSIS — H28 CATARACT IN DISEASES CLASSIFIED ELSEWHERE: ICD-10-CM

## 2024-01-03 DIAGNOSIS — I50.9 HEART FAILURE, UNSPECIFIED (H): ICD-10-CM

## 2024-01-04 LAB
ANION GAP SERPL CALCULATED.3IONS-SCNC: 9 MMOL/L (ref 7–15)
BUN SERPL-MCNC: 18.1 MG/DL (ref 8–23)
CALCIUM SERPL-MCNC: 8.8 MG/DL (ref 8.8–10.2)
CHLORIDE SERPL-SCNC: 105 MMOL/L (ref 98–107)
CREAT SERPL-MCNC: 0.79 MG/DL (ref 0.51–0.95)
DEPRECATED HCO3 PLAS-SCNC: 25 MMOL/L (ref 22–29)
EGFRCR SERPLBLD CKD-EPI 2021: 76 ML/MIN/1.73M2
GLUCOSE SERPL-MCNC: 100 MG/DL (ref 70–99)
POTASSIUM SERPL-SCNC: 3.9 MMOL/L (ref 3.4–5.3)
SODIUM SERPL-SCNC: 139 MMOL/L (ref 135–145)

## 2024-01-04 PROCEDURE — P9603 ONE-WAY ALLOW PRORATED MILES: HCPCS | Mod: ORL | Performed by: NURSE PRACTITIONER

## 2024-01-04 PROCEDURE — 36415 COLL VENOUS BLD VENIPUNCTURE: CPT | Mod: ORL

## 2024-01-04 PROCEDURE — 80048 BASIC METABOLIC PNL TOTAL CA: CPT | Mod: ORL | Performed by: NURSE PRACTITIONER

## 2024-01-04 PROCEDURE — P9603 ONE-WAY ALLOW PRORATED MILES: HCPCS | Mod: ORL

## 2024-01-04 PROCEDURE — 36415 COLL VENOUS BLD VENIPUNCTURE: CPT | Mod: ORL | Performed by: NURSE PRACTITIONER

## 2024-01-04 PROCEDURE — 80048 BASIC METABOLIC PNL TOTAL CA: CPT | Mod: ORL

## 2024-02-17 ENCOUNTER — HEALTH MAINTENANCE LETTER (OUTPATIENT)
Age: 79
End: 2024-02-17

## 2024-05-20 ENCOUNTER — PATIENT OUTREACH (OUTPATIENT)
Dept: CARE COORDINATION | Facility: CLINIC | Age: 79
End: 2024-05-20
Payer: COMMERCIAL

## 2024-06-05 NOTE — PATIENT INSTRUCTIONS
You have the formation of cataracts.  You may notice some blurred vision or glare with night driving.  It is important that you wear good sunglasses to protect your eyes from the ultraviolet light from the sun. I recommend that you return in 1 year for an eye exam unless there are any sudden changes in your vision.       Whitley was advised of today's exam findings.  Optional to fill new glasses prescription  Allow 2 weeks to adapt to change in glasses  Wear glasses full time  Copy of glasses Rx provided today.  Return in 1 year for eye exam, or sooner if needed.    The effects of the dilating drops last for 4- 6 hours.  You will be more sensitive to light and vision will be blurry up close.  Mydriatic sunglasses were given if needed.      Christiano Hand O.D.  [address]  [place]  [address]    [phone]     4 = No assist / stand by assistance

## 2024-12-05 ENCOUNTER — LAB REQUISITION (OUTPATIENT)
Dept: LAB | Facility: CLINIC | Age: 79
End: 2024-12-05
Payer: COMMERCIAL

## 2024-12-05 DIAGNOSIS — R53.1 WEAKNESS: ICD-10-CM

## 2024-12-05 DIAGNOSIS — R63.8 OTHER SYMPTOMS AND SIGNS CONCERNING FOOD AND FLUID INTAKE: ICD-10-CM
